# Patient Record
Sex: MALE | Race: WHITE | NOT HISPANIC OR LATINO | Employment: FULL TIME | URBAN - METROPOLITAN AREA
[De-identification: names, ages, dates, MRNs, and addresses within clinical notes are randomized per-mention and may not be internally consistent; named-entity substitution may affect disease eponyms.]

---

## 2019-05-21 ENCOUNTER — OFFICE VISIT (OUTPATIENT)
Dept: FAMILY MEDICINE CLINIC | Facility: CLINIC | Age: 54
End: 2019-05-21
Payer: COMMERCIAL

## 2019-05-21 VITALS
TEMPERATURE: 98.3 F | HEART RATE: 75 BPM | OXYGEN SATURATION: 96 % | DIASTOLIC BLOOD PRESSURE: 80 MMHG | SYSTOLIC BLOOD PRESSURE: 132 MMHG | BODY MASS INDEX: 33.7 KG/M2 | WEIGHT: 190.2 LBS | HEIGHT: 63 IN

## 2019-05-21 DIAGNOSIS — Z23 NEED FOR VACCINATION: Primary | ICD-10-CM

## 2019-05-21 DIAGNOSIS — Z23 NEED FOR SHINGLES VACCINE: ICD-10-CM

## 2019-05-21 DIAGNOSIS — L98.9 SKIN LESION OF BACK: ICD-10-CM

## 2019-05-21 DIAGNOSIS — G89.29 CHRONIC PAIN OF LEFT KNEE: ICD-10-CM

## 2019-05-21 DIAGNOSIS — M25.562 CHRONIC PAIN OF LEFT KNEE: ICD-10-CM

## 2019-05-21 DIAGNOSIS — M54.50 CHRONIC BILATERAL LOW BACK PAIN WITHOUT SCIATICA: ICD-10-CM

## 2019-05-21 DIAGNOSIS — Z23 NEED FOR TDAP VACCINATION: ICD-10-CM

## 2019-05-21 DIAGNOSIS — G89.29 CHRONIC BILATERAL LOW BACK PAIN WITHOUT SCIATICA: ICD-10-CM

## 2019-05-21 DIAGNOSIS — Z13.6 SCREENING FOR CARDIOVASCULAR CONDITION: ICD-10-CM

## 2019-05-21 PROCEDURE — 99204 OFFICE O/P NEW MOD 45 MIN: CPT | Performed by: FAMILY MEDICINE

## 2019-05-21 PROCEDURE — 3008F BODY MASS INDEX DOCD: CPT | Performed by: FAMILY MEDICINE

## 2019-05-21 PROCEDURE — 90471 IMMUNIZATION ADMIN: CPT | Performed by: FAMILY MEDICINE

## 2019-05-21 PROCEDURE — 90750 HZV VACC RECOMBINANT IM: CPT | Performed by: FAMILY MEDICINE

## 2019-05-30 ENCOUNTER — APPOINTMENT (OUTPATIENT)
Dept: RADIOLOGY | Facility: CLINIC | Age: 54
End: 2019-05-30
Payer: COMMERCIAL

## 2019-05-30 VITALS
DIASTOLIC BLOOD PRESSURE: 75 MMHG | HEIGHT: 63 IN | BODY MASS INDEX: 32.78 KG/M2 | WEIGHT: 185 LBS | SYSTOLIC BLOOD PRESSURE: 106 MMHG | HEART RATE: 98 BPM

## 2019-05-30 DIAGNOSIS — G89.29 CHRONIC BILATERAL LOW BACK PAIN WITHOUT SCIATICA: ICD-10-CM

## 2019-05-30 DIAGNOSIS — M22.42 CHONDROMALACIA, PATELLA, LEFT: ICD-10-CM

## 2019-05-30 DIAGNOSIS — M25.562 LEFT KNEE PAIN, UNSPECIFIED CHRONICITY: Primary | ICD-10-CM

## 2019-05-30 DIAGNOSIS — M25.562 CHRONIC PAIN OF LEFT KNEE: ICD-10-CM

## 2019-05-30 DIAGNOSIS — M54.50 CHRONIC BILATERAL LOW BACK PAIN WITHOUT SCIATICA: ICD-10-CM

## 2019-05-30 DIAGNOSIS — M25.562 LEFT KNEE PAIN, UNSPECIFIED CHRONICITY: ICD-10-CM

## 2019-05-30 DIAGNOSIS — G89.29 CHRONIC PAIN OF LEFT KNEE: ICD-10-CM

## 2019-05-30 PROCEDURE — 73562 X-RAY EXAM OF KNEE 3: CPT

## 2019-05-30 PROCEDURE — 99243 OFF/OP CNSLTJ NEW/EST LOW 30: CPT | Performed by: ORTHOPAEDIC SURGERY

## 2019-05-30 PROCEDURE — 20610 DRAIN/INJ JOINT/BURSA W/O US: CPT | Performed by: ORTHOPAEDIC SURGERY

## 2019-05-30 PROCEDURE — 73564 X-RAY EXAM KNEE 4 OR MORE: CPT

## 2019-05-30 RX ORDER — LIDOCAINE HYDROCHLORIDE 10 MG/ML
3 INJECTION, SOLUTION EPIDURAL; INFILTRATION; INTRACAUDAL; PERINEURAL
Status: COMPLETED | OUTPATIENT
Start: 2019-05-30 | End: 2019-05-30

## 2019-05-30 RX ORDER — METHYLPREDNISOLONE ACETATE 40 MG/ML
1 INJECTION, SUSPENSION INTRA-ARTICULAR; INTRALESIONAL; INTRAMUSCULAR; SOFT TISSUE
Status: COMPLETED | OUTPATIENT
Start: 2019-05-30 | End: 2019-05-30

## 2019-05-30 RX ADMIN — METHYLPREDNISOLONE ACETATE 1 ML: 40 INJECTION, SUSPENSION INTRA-ARTICULAR; INTRALESIONAL; INTRAMUSCULAR; SOFT TISSUE at 15:37

## 2019-05-30 RX ADMIN — LIDOCAINE HYDROCHLORIDE 3 ML: 10 INJECTION, SOLUTION EPIDURAL; INFILTRATION; INTRACAUDAL; PERINEURAL at 15:37

## 2019-06-05 ENCOUNTER — EVALUATION (OUTPATIENT)
Dept: PHYSICAL THERAPY | Facility: CLINIC | Age: 54
End: 2019-06-05
Payer: COMMERCIAL

## 2019-06-05 ENCOUNTER — CLINICAL SUPPORT (OUTPATIENT)
Dept: FAMILY MEDICINE CLINIC | Facility: CLINIC | Age: 54
End: 2019-06-05

## 2019-06-05 DIAGNOSIS — Z23 NEED FOR TDAP VACCINATION: Primary | ICD-10-CM

## 2019-06-05 DIAGNOSIS — M22.42 CHONDROMALACIA, PATELLA, LEFT: ICD-10-CM

## 2019-06-05 PROCEDURE — 97110 THERAPEUTIC EXERCISES: CPT | Performed by: PHYSICAL THERAPIST

## 2019-06-05 PROCEDURE — 97161 PT EVAL LOW COMPLEX 20 MIN: CPT | Performed by: PHYSICAL THERAPIST

## 2019-06-17 ENCOUNTER — APPOINTMENT (OUTPATIENT)
Dept: PHYSICAL THERAPY | Facility: CLINIC | Age: 54
End: 2019-06-17
Payer: COMMERCIAL

## 2019-06-17 NOTE — PROGRESS NOTES
Daily Note     Today's date: 2019  Patient name: Divina Stubbs  : 1965  MRN: [id number]  Referring provider: Gautam Blevins DO  Dx: No diagnosis found  Subjective: ***      Objective: See treatment diary below    Precautions: None      Daily Treatment Diary       Manuals                                                                 Exercise Diary              bike nv            HR step nv            prostretch nv            squats nv            Rev lunges nv            Side planks nv            Prone planks w/ hip ext nv            pallof press nv                                                                                                                                                                                                  Modalities                                         Assessment: Tolerated treatment {Tolerated treatment :8201297129}  Patient {assessment:0788210755}      Plan: {PLAN:8118819604}    {There is no content from the last Daily Treatment Diary section  }

## 2019-06-24 ENCOUNTER — OFFICE VISIT (OUTPATIENT)
Dept: PHYSICAL THERAPY | Facility: CLINIC | Age: 54
End: 2019-06-24
Payer: COMMERCIAL

## 2019-06-24 DIAGNOSIS — M22.42 CHONDROMALACIA, PATELLA, LEFT: Primary | ICD-10-CM

## 2019-06-24 PROCEDURE — 97110 THERAPEUTIC EXERCISES: CPT | Performed by: PHYSICAL THERAPIST

## 2019-07-23 ENCOUNTER — CLINICAL SUPPORT (OUTPATIENT)
Dept: FAMILY MEDICINE CLINIC | Facility: CLINIC | Age: 54
End: 2019-07-23
Payer: COMMERCIAL

## 2019-07-23 DIAGNOSIS — Z23 NEED FOR SHINGLES VACCINE: Primary | ICD-10-CM

## 2019-07-23 PROCEDURE — 90750 HZV VACC RECOMBINANT IM: CPT | Performed by: FAMILY MEDICINE

## 2019-07-23 PROCEDURE — 90471 IMMUNIZATION ADMIN: CPT | Performed by: FAMILY MEDICINE

## 2019-07-26 ENCOUNTER — OFFICE VISIT (OUTPATIENT)
Dept: OBGYN CLINIC | Facility: CLINIC | Age: 54
End: 2019-07-26
Payer: COMMERCIAL

## 2019-07-26 VITALS
BODY MASS INDEX: 33.49 KG/M2 | HEIGHT: 63 IN | SYSTOLIC BLOOD PRESSURE: 118 MMHG | DIASTOLIC BLOOD PRESSURE: 75 MMHG | HEART RATE: 76 BPM | WEIGHT: 189 LBS

## 2019-07-26 DIAGNOSIS — M22.42 CHONDROMALACIA OF LEFT PATELLOFEMORAL JOINT: Primary | ICD-10-CM

## 2019-07-26 PROCEDURE — 99213 OFFICE O/P EST LOW 20 MIN: CPT | Performed by: ORTHOPAEDIC SURGERY

## 2019-07-26 NOTE — PROGRESS NOTES
Ortho Sports Medicine Knee Follow Up Visit     Assesment:     47year old Male Left knee patellofemoral chondromalacia, doing great with no issues    Plan:    Conservative treatment:    Ice to knee for 20 minutes at least 1-2 times daily  Continue the exercises shown at PT at home for ROM/strengthening to knee, hip and core  4-point hip exercises sheet given   Continue weight loss     Imaging: All imaging from today was reviewed by myself and explained to the patient  Injection:    No Injection planned at this time  Surgery:     No surgery is recommended at this point, continue with conservative treatment plan as noted  Follow up:    Return if symptoms worsen or fail to improve  Chief Complaint   Patient presents with    Left Knee - Pain       History of Present Illness: The patient is returns for follow up of left knee pain  Since the prior visit, He reports significant improvement  He is no longer experiencing pain and clicking in his knee  He had to think which knee it was that was bothering him  The injection he received at his last appointment is still giving him relief  He had 3 sessions at PT and learned some exercises that he has continued to do at home  He also has learned to decrease the amount of time he crosses his legs because that was a source of pain he was unaware of prior  Pain was located anterior  Pain is improved by rest, physical therapy and injection  Pain is aggravated by stairs, squatting and weight bearing  Symptoms include clicking  The patient has tried rest, ice, NSAIDS, physical therapy and injection  I have reviewed the past medical, surgical, social and family history, medications and allergies as documented in the EMR  Review of systems: ROS is negative other than that noted in the HPI  Constitutional: Negative for fatigue and fever        Physical Exam:    Blood pressure 118/75, pulse 76, height 5' 3" (1 6 m), weight 85 7 kg (189 lb)  General/Constitutional: NAD, well developed, well nourished  HENT: Normocephalic, atraumatic  CV: Intact distal pulses, regular rate  Resp: No respiratory distress or labored breathing  Lymphatic: No lymphadenopathy palpated  Neuro: Alert and Oriented x 3, no focal deficits  Psych: Normal mood, normal affect, normal judgement, normal behavior  Skin: Warm, dry, no rashes, no erythema      Knee Exam (focused): RIGHT LEFT   ROM:   0-130 0-130   Palpation: Effusion negative negative     MJL tenderness Negative Negative     LJL tenderness Negative Negative   Meniscus:  Fartun Negative Negative    Apley's Compression Negative Negative   Instability: Varus stable stable     Valgus stable stable   Special Tests: Lachman Negative Negative     Posterior drawer Negative Negative     Anterior drawer Negative Negative     Pivot shift not tested not tested     Dial not tested not tested   Patella: Palpation no tenderness no tenderness     Mobility 1/4 1/4     Apprehension Negative Negative   Other: Single leg 1/4 squat not tested not tested                 LE NV Exam: +2 DP/PT pulses bilaterally  Sensation intact to light touch L2-S1 bilaterally    No calf tenderness to palpation bilaterally      Knee Imaging    No imaging was performed today

## 2019-07-29 ENCOUNTER — OFFICE VISIT (OUTPATIENT)
Dept: FAMILY MEDICINE CLINIC | Facility: CLINIC | Age: 54
End: 2019-07-29
Payer: COMMERCIAL

## 2019-07-29 VITALS
HEART RATE: 86 BPM | BODY MASS INDEX: 34.55 KG/M2 | SYSTOLIC BLOOD PRESSURE: 120 MMHG | WEIGHT: 195 LBS | OXYGEN SATURATION: 97 % | TEMPERATURE: 97.6 F | DIASTOLIC BLOOD PRESSURE: 78 MMHG | HEIGHT: 63 IN

## 2019-07-29 DIAGNOSIS — L25.5 CONTACT DERMATITIS DUE TO PLANTS, EXCEPT FOOD, UNSPECIFIED CONTACT DERMATITIS TYPE: Primary | ICD-10-CM

## 2019-07-29 DIAGNOSIS — L98.9 SKIN LESION OF BACK: ICD-10-CM

## 2019-07-29 PROCEDURE — 1036F TOBACCO NON-USER: CPT | Performed by: FAMILY MEDICINE

## 2019-07-29 PROCEDURE — 99213 OFFICE O/P EST LOW 20 MIN: CPT | Performed by: FAMILY MEDICINE

## 2019-07-29 PROCEDURE — 3008F BODY MASS INDEX DOCD: CPT | Performed by: FAMILY MEDICINE

## 2019-07-29 RX ORDER — HYDROXYZINE HYDROCHLORIDE 25 MG/1
25 TABLET, FILM COATED ORAL EVERY 6 HOURS PRN
Qty: 30 TABLET | Refills: 0 | Status: SHIPPED | OUTPATIENT
Start: 2019-07-29 | End: 2022-06-07

## 2019-07-29 RX ORDER — PREDNISONE 10 MG/1
TABLET ORAL
Qty: 45 TABLET | Refills: 0 | Status: SHIPPED | OUTPATIENT
Start: 2019-07-29 | End: 2021-11-04 | Stop reason: ALTCHOICE

## 2019-07-29 NOTE — PROGRESS NOTES
Ernesto Bonilla 1965 male MRN: [id number]    Family Medicine Acute Visit    ASSESSMENT/PLAN  Problem List Items Addressed This Visit        Musculoskeletal and Integument    Skin lesion of back    Relevant Orders    Ambulatory referral to Dermatology    Contact dermatitis due to plants, except food - Primary    Relevant Medications    predniSONE 10 mg tablet    hydrOXYzine HCL (ATARAX) 25 mg tablet                Future Appointments   Date Time Provider Shonda Yoo   7/29/2019  2:30 PM DO [facility]          SUBJECTIVE  CC: Poison Ivy (Poison ivy arms, legs, neck)      HPI:  Ernesto Bonilla is a 47 y o  male who presents for poison ivy  Review of Systems   Constitutional: Negative for chills and fever  HENT: Negative for congestion, postnasal drip, rhinorrhea and sinus pain  Eyes: Negative for photophobia and visual disturbance  Respiratory: Negative for cough and shortness of breath  Cardiovascular: Negative for chest pain, palpitations and leg swelling  Gastrointestinal: Negative for abdominal pain, constipation, diarrhea, nausea and vomiting  Genitourinary: Negative for difficulty urinating and dysuria  Musculoskeletal: Negative for arthralgias and myalgias  Skin: Positive for rash  Neurological: Negative for dizziness and syncope  Historical Information   The patient history was reviewed as follows:  History reviewed  No pertinent past medical history        Past Surgical History:   Procedure Laterality Date    TONSILLECTOMY       Family History   Problem Relation Age of Onset    Heart defect Mother     Stroke Father     Coronary artery disease Father     Prostate cancer Father     Diabetes Maternal Grandmother     Diabetes Maternal Aunt     Diabetes Paternal Uncle       Social History   Social History     Substance and Sexual Activity   Alcohol Use Yes    Alcohol/week: 2 0 standard drinks    Types: 2 Glasses of wine per week  Frequency: 2-3 times a week    Drinks per session: 1 or 2    Binge frequency: Weekly     Social History     Substance and Sexual Activity   Drug Use Never     Social History     Tobacco Use   Smoking Status Never Smoker   Smokeless Tobacco Never Used       Medications:     Current Outpatient Medications:     hydrOXYzine HCL (ATARAX) 25 mg tablet, Take 1 tablet (25 mg total) by mouth every 6 (six) hours as needed for itching, Disp: 30 tablet, Rfl: 0    predniSONE 10 mg tablet, Take 5 tablets/day for 3 days, 4 tablets/day for 3 days, 3 tablets/day for 3 days, 2 tablets/day for 3 days, 1 tablet/day for 3 days, Disp: 45 tablet, Rfl: 0    Allergies   Allergen Reactions    Penicillins      Patient was told by parents        OBJECTIVE  Vitals:   Vitals:    07/29/19 1409   BP: 120/78   BP Location: Right arm   Patient Position: Sitting   Cuff Size: Large   Pulse: 86   Temp: 97 6 °F (36 4 °C)   TempSrc: Tympanic   SpO2: 97%   Weight: 88 5 kg (195 lb)   Height: 5' 3" (1 6 m)         Physical Exam   Constitutional: He is oriented to person, place, and time  He appears well-developed and well-nourished  HENT:   Head: Normocephalic and atraumatic  Right Ear: External ear normal    Left Ear: External ear normal    Mouth/Throat: Oropharynx is clear and moist    Eyes: Pupils are equal, round, and reactive to light  Conjunctivae and EOM are normal    Neck: Normal range of motion  Neck supple  Cardiovascular: Normal rate, regular rhythm and normal heart sounds  No murmur heard  Pulmonary/Chest: Effort normal and breath sounds normal  No respiratory distress  He has no wheezes  Abdominal: Soft  Bowel sounds are normal  He exhibits no distension  Musculoskeletal: Normal range of motion  He exhibits no edema  Neurological: He is alert and oriented to person, place, and time  Skin: Skin is warm and dry  Rash noted  Rash is urticarial    Psychiatric: He has a normal mood and affect   His behavior is normal  Johann Thrasher, DO    7/29/2019

## 2021-04-08 DIAGNOSIS — Z23 ENCOUNTER FOR IMMUNIZATION: ICD-10-CM

## 2021-06-26 ENCOUNTER — OFFICE VISIT (OUTPATIENT)
Dept: URGENT CARE | Facility: CLINIC | Age: 56
End: 2021-06-26
Payer: COMMERCIAL

## 2021-06-26 VITALS
BODY MASS INDEX: 33.63 KG/M2 | RESPIRATION RATE: 16 BRPM | DIASTOLIC BLOOD PRESSURE: 88 MMHG | OXYGEN SATURATION: 95 % | HEART RATE: 83 BPM | TEMPERATURE: 99.2 F | HEIGHT: 63 IN | SYSTOLIC BLOOD PRESSURE: 134 MMHG | WEIGHT: 189.8 LBS

## 2021-06-26 DIAGNOSIS — L25.5 DERMATITIS DUE TO PLANTS, INCLUDING POISON IVY, SUMAC, AND OAK: Primary | ICD-10-CM

## 2021-06-26 PROCEDURE — 99213 OFFICE O/P EST LOW 20 MIN: CPT | Performed by: PHYSICIAN ASSISTANT

## 2021-06-26 RX ORDER — DIPHENOXYLATE HYDROCHLORIDE AND ATROPINE SULFATE 2.5; .025 MG/1; MG/1
1 TABLET ORAL DAILY
COMMUNITY

## 2021-06-26 RX ORDER — PREDNISONE 20 MG/1
TABLET ORAL
Qty: 18 TABLET | Refills: 0 | Status: SHIPPED | OUTPATIENT
Start: 2021-06-26 | End: 2021-11-04 | Stop reason: ALTCHOICE

## 2021-06-26 NOTE — PATIENT INSTRUCTIONS
Keep area clean and dry  Can use cool compresses to decrease itching  Heat can make areas itch more  Can use over-the-counter poison ivy creams and lotions as needed  Take prednisone in the morning with food  Wash hands often and try to avoid scratching at areas  Can use over-the-counter creams and lotions like [place], Zanfel or Aveeno to help with symptoms  Do not take NSAIDs (ibuprofen, Advil, Motrin, naproxen, Aleve, aspirin) while taking prednisone  Poison Ivy   WHAT YOU NEED TO KNOW:   Poison ivy is a plant that can cause an itchy, uncomfortable rash on your skin  Poison ivy grows as a shrub or vine in woods, fields, and areas of thick [place]  It has 3 bright green leaves on each stem that turn red in duc  DISCHARGE INSTRUCTIONS:   Medicines:   · Antiseptic or drying creams or ointments: These medicines may be used to dry out the rash and decrease the itching  These products may be available without a doctor's order  · Steroids: This medicine helps decrease itching and inflammation  It can be given as a cream to apply to your skin or as a pill  · Antihistamines: This medicine may help decrease itching and help you sleep  It is available without a doctor's order  · Take your medicine as directed  Contact your healthcare provider if you think your medicine is not helping or if you have side effects  Tell him of her if you are allergic to any medicine  Keep a list of the medicines, vitamins, and herbs you take  Include the amounts, and when and why you take them  Bring the list or the pill bottles to follow-up visits  Carry your medicine list with you in case of an emergency  Follow up with your healthcare provider as directed:  Write down your questions so you remember to ask them during your visits  How your poison ivy rash spreads: You cannot spread poison ivy by touching your rash or the liquid from your blisters   Poison ivy is spread only if you scratch your skin while it still has oil on it  You may think your rash is spreading because new rashes appear over a number of days  This happens because areas covered by thin skin break out in a rash first  Your face or forearms may develop a rash before thicker areas, such as the palms of your hands  Self-care:   · Keep your rash clean and dry:  Wash it with soap and water  Gently pat it dry with a clean towel  · Try not to scratch or rub your rash: This can cause your skin to become infected  · Use a compress on your rash:  Dip a clean washcloth in cool water  Wring it out and place it on your rash  Leave the washcloth on your skin for 15 minutes  Do this at least 3 times per day  · Take a cornstarch or oatmeal bath: If your rash is too large to cover with wet washcloths, take 3 or 4 cornstarch baths daily  Mix 1 pound of cornstarch with a little water to make a paste  Add the paste to a tub full of water and mix well  You may also use colloidal oatmeal in the bath water  Use lukewarm water  Avoid hot water because it may cause your itching to increase  Prevent a poison ivy rash in the future:   · Wear skin protection:  Wear long pants, a long-sleeved shirt, and gloves  Use a skin block lotion to protect your skin from poison ivy oil  You can find this at a drugstore without a prescription  · Wash clothing after possible exposure: If you think you have been near a poison ivy plant, wash the clothes you were wearing separately from other clothes  Rinse the washing machine well after you take the clothes out  Scrub boots and shoes with warm, soapy water  Dry clean items and clothing that you cannot wash in water  Poison ivy oil is sticky and can stay on surfaces for a long time  It can cause a new rash even years later  · Bathe your pet:  Use warm water and shampoo on your pet's fur  This will prevent the spread of oil to your skin, car, and home   Wear long sleeves, long pants, and gloves while washing pets or any items that may have oil on them  · Reduce exposure to poison ivy:  Do not touch plants that look like poison ivy  Keep your yard free of poison ivy  While protecting your skin, remove the plant and the roots  Place them in a plastic bag and seal the bag tightly  · Do not burn poison ivy plants: This can spread the oil through the air  If you breathe the oil into your lungs, you could have swelling and serious breathing problems  Oil that clings to the fire gianfranco can land on your skin and cause a rash  Contact your healthcare provider if:   · You have pus, soft yellow scabs, or tenderness on the rash  · The itching gets worse or keeps you awake at night  · The rash covers more than 1/4 of your skin or spreads to your eyes, mouth, or genital area  · The rash is not better after 2 to 3 weeks  · You have tender, swollen glands on the sides of your neck  · You have swelling in your arms and legs  · You have questions or concerns about your condition or care  Return to the emergency department if:   · You have a fever  · You have redness, swelling, and tenderness around the rash  · You have trouble breathing  © Copyright [address] Information is for End User's use only and may not be sold, redistributed or otherwise used for commercial purposes  All illustrations and images included in CareNotes® are the copyrighted property of A D A M [employer] Inc  or Abelardo Apodaca  The above information is an  only  It is not intended as medical advice for individual conditions or treatments  Talk to your doctor, nurse or pharmacist before following any medical regimen to see if it is safe and effective for you

## 2021-08-30 ENCOUNTER — OFFICE VISIT (OUTPATIENT)
Dept: FAMILY MEDICINE CLINIC | Facility: CLINIC | Age: 56
End: 2021-08-30
Payer: COMMERCIAL

## 2021-08-30 ENCOUNTER — APPOINTMENT (OUTPATIENT)
Dept: RADIOLOGY | Facility: CLINIC | Age: 56
End: 2021-08-30
Payer: COMMERCIAL

## 2021-08-30 VITALS
RESPIRATION RATE: 18 BRPM | DIASTOLIC BLOOD PRESSURE: 80 MMHG | TEMPERATURE: 98.1 F | BODY MASS INDEX: 34.69 KG/M2 | OXYGEN SATURATION: 96 % | HEIGHT: 63 IN | WEIGHT: 195.8 LBS | SYSTOLIC BLOOD PRESSURE: 128 MMHG | HEART RATE: 76 BPM

## 2021-08-30 DIAGNOSIS — R05.9 COUGH: Primary | ICD-10-CM

## 2021-08-30 DIAGNOSIS — J40 BRONCHITIS: ICD-10-CM

## 2021-08-30 DIAGNOSIS — B34.9 VIRAL INFECTION, UNSPECIFIED: ICD-10-CM

## 2021-08-30 DIAGNOSIS — R05.9 COUGH: ICD-10-CM

## 2021-08-30 PROCEDURE — U0003 INFECTIOUS AGENT DETECTION BY NUCLEIC ACID (DNA OR RNA); SEVERE ACUTE RESPIRATORY SYNDROME CORONAVIRUS 2 (SARS-COV-2) (CORONAVIRUS DISEASE [COVID-19]), AMPLIFIED PROBE TECHNIQUE, MAKING USE OF HIGH THROUGHPUT TECHNOLOGIES AS DESCRIBED BY CMS-2020-01-R: HCPCS | Performed by: FAMILY MEDICINE

## 2021-08-30 PROCEDURE — 99214 OFFICE O/P EST MOD 30 MIN: CPT | Performed by: FAMILY MEDICINE

## 2021-08-30 PROCEDURE — 1036F TOBACCO NON-USER: CPT | Performed by: FAMILY MEDICINE

## 2021-08-30 PROCEDURE — 71046 X-RAY EXAM CHEST 2 VIEWS: CPT

## 2021-08-30 PROCEDURE — 3725F SCREEN DEPRESSION PERFORMED: CPT | Performed by: FAMILY MEDICINE

## 2021-08-30 PROCEDURE — U0005 INFEC AGEN DETEC AMPLI PROBE: HCPCS | Performed by: FAMILY MEDICINE

## 2021-08-30 PROCEDURE — 3008F BODY MASS INDEX DOCD: CPT | Performed by: FAMILY MEDICINE

## 2021-08-30 RX ORDER — PREDNISONE 10 MG/1
TABLET ORAL
Qty: 21 TABLET | Refills: 0 | Status: SHIPPED | OUTPATIENT
Start: 2021-08-30 | End: 2021-11-04 | Stop reason: ALTCHOICE

## 2021-08-30 RX ORDER — DOXYCYCLINE HYCLATE 100 MG/1
100 CAPSULE ORAL EVERY 12 HOURS SCHEDULED
Qty: 14 CAPSULE | Refills: 0 | Status: SHIPPED | OUTPATIENT
Start: 2021-08-30 | End: 2021-09-06

## 2021-08-30 NOTE — PROGRESS NOTES
Walt Snow 1965 male MRN: [id number]    Family Medicine Acute Visit    ASSESSMENT/PLAN  Problem List Items Addressed This Visit        Other    Cough - Primary    Relevant Orders    XR chest pa & lateral    BMI 34 0-34 9,adult     BMI Counseling: Body mass index is 34 68 kg/m²  The BMI is above normal  Nutrition recommendations include reducing portion sizes, decreasing overall calorie intake and 3-5 servings of fruits/vegetables daily  Exercise recommendations include exercising 3-5 times per week  Other Visit Diagnoses     Viral infection, unspecified        Relevant Orders    Novel Coronavirus (Covid-19),PCR [facility] - Collected in Office    XR chest pa & lateral    Bronchitis        Relevant Medications    doxycycline hyclate (VIBRAMYCIN) 100 mg capsule    predniSONE 10 mg tablet            Follow up for CP    Future Appointments   Date Time Provider Shonda Yoo   10/14/2021  7:30 AM DO [facility]          SUBJECTIVE  CC: Chest Congestion (was cleaning out basement and touched moldy water ), Chest Tightness, and Cough      HPI:  Walt Snow is a 48 y o  male who presents for sick visit  1 week of cough, congestion, chest tightness  Reports last week he was cleaning out his basement and had exposure to possible mold  He is not having fever  No exposure to covid  Review of Systems   Constitutional: Negative for chills and fever  HENT: Positive for congestion  Negative for postnasal drip, rhinorrhea and sinus pain  Eyes: Negative for photophobia and visual disturbance  Respiratory: Positive for cough and wheezing  Negative for shortness of breath  Cardiovascular: Negative for chest pain, palpitations and leg swelling  Gastrointestinal: Negative for abdominal pain, constipation, diarrhea, nausea and vomiting  Genitourinary: Negative for difficulty urinating and dysuria  Musculoskeletal: Negative for arthralgias and myalgias     Skin: Negative for rash  Neurological: Negative for dizziness and syncope  Historical Information   The patient history was reviewed as follows:  Past Medical History:   Diagnosis Date    Chondromalacia of left patellofemoral joint          Past Surgical History:   Procedure Laterality Date    TONSILLECTOMY       Family History   Problem Relation Age of Onset    Heart defect Mother     Stroke Father     Coronary artery disease Father     Prostate cancer Father     Diabetes Maternal Grandmother     Diabetes Maternal Aunt     Diabetes Paternal Uncle       Social History   Social History     Substance and Sexual Activity   Alcohol Use Yes    Alcohol/week: 2 0 standard drinks    Types: 2 Glasses of wine per week     Social History     Substance and Sexual Activity   Drug Use Never     Social History     Tobacco Use   Smoking Status Never Smoker   Smokeless Tobacco Never Used       Medications:     Current Outpatient Medications:     multivitamin (THERAGRAN) TABS, Take 1 tablet by mouth daily, Disp: , Rfl:     doxycycline hyclate (VIBRAMYCIN) 100 mg capsule, Take 1 capsule (100 mg total) by mouth every 12 (twelve) hours for 7 days, Disp: 14 capsule, Rfl: 0    hydrOXYzine HCL (ATARAX) 25 mg tablet, Take 1 tablet (25 mg total) by mouth every 6 (six) hours as needed for itching (Patient not taking: Reported on 6/26/2021), Disp: 30 tablet, Rfl: 0    predniSONE 10 mg tablet, Take 5 tablets/day for 3 days, 4 tablets/day for 3 days, 3 tablets/day for 3 days, 2 tablets/day for 3 days, 1 tablet/day for 3 days (Patient not taking: Reported on 6/26/2021), Disp: 45 tablet, Rfl: 0    predniSONE 10 mg tablet, Take 60 mg x 1 day, then 50 mg x1 day, then 40 mg x1 day, then 30 mg x1 day, then 20 mg x 1 day, then  10 mg x1 day, Disp: 21 tablet, Rfl: 0    predniSONE 20 mg tablet, Take 3 tablets for 3 days, then 2 tablets for 3 days, then 1 tablet for 3 days  Take in morning with food   (Patient not taking: Reported on 8/30/2021), Disp: 18 tablet, Rfl: 0    Allergies   Allergen Reactions    Penicillins      Patient was told by parents        OBJECTIVE  Vitals:   Vitals:    08/30/21 0949   BP: 128/80   BP Location: Left arm   Patient Position: Sitting   Cuff Size: Large   Pulse: 76   Resp: 18   Temp: 98 1 °F (36 7 °C)   TempSrc: Tympanic   SpO2: 96%   Weight: 88 8 kg (195 lb 12 8 oz)   Height: 5' 3" (1 6 m)         Physical Exam  Constitutional:       Appearance: He is well-developed  HENT:      Head: Normocephalic and atraumatic  Right Ear: External ear normal       Left Ear: External ear normal       Mouth/Throat:      Pharynx: Posterior oropharyngeal erythema present  Eyes:      Conjunctiva/sclera: Conjunctivae normal       Pupils: Pupils are equal, round, and reactive to light  Cardiovascular:      Rate and Rhythm: Normal rate and regular rhythm  Heart sounds: Normal heart sounds  No murmur heard  Pulmonary:      Effort: Pulmonary effort is normal  No respiratory distress  Breath sounds: Normal breath sounds  No wheezing  Comments: Course breath sounds b/l  Musculoskeletal:         General: Normal range of motion  Cervical back: Normal range of motion and neck supple  Skin:     General: Skin is warm and dry  Neurological:      Mental Status: He is alert and oriented to person, place, and time     Psychiatric:         Behavior: Behavior normal                     Mariel Rhoades DO    8/30/2021

## 2021-08-30 NOTE — ASSESSMENT & PLAN NOTE
BMI Counseling: Body mass index is 34 68 kg/m²  The BMI is above normal  Nutrition recommendations include reducing portion sizes, decreasing overall calorie intake and 3-5 servings of fruits/vegetables daily  Exercise recommendations include exercising 3-5 times per week

## 2021-08-31 LAB — SARS-COV-2 RNA RESP QL NAA+PROBE: NEGATIVE

## 2021-11-04 ENCOUNTER — OFFICE VISIT (OUTPATIENT)
Dept: FAMILY MEDICINE CLINIC | Facility: CLINIC | Age: 56
End: 2021-11-04
Payer: COMMERCIAL

## 2021-11-04 VITALS
BODY MASS INDEX: 33.38 KG/M2 | RESPIRATION RATE: 18 BRPM | HEIGHT: 63 IN | DIASTOLIC BLOOD PRESSURE: 78 MMHG | WEIGHT: 188.4 LBS | HEART RATE: 76 BPM | OXYGEN SATURATION: 96 % | TEMPERATURE: 98.1 F | SYSTOLIC BLOOD PRESSURE: 130 MMHG

## 2021-11-04 DIAGNOSIS — Z12.83 SKIN EXAM, SCREENING FOR CANCER: ICD-10-CM

## 2021-11-04 DIAGNOSIS — Z12.5 SCREENING FOR PROSTATE CANCER: ICD-10-CM

## 2021-11-04 DIAGNOSIS — Z00.00 ANNUAL PHYSICAL EXAM: Primary | ICD-10-CM

## 2021-11-04 DIAGNOSIS — Z82.49 FAMILY HISTORY OF ABDOMINAL AORTIC ANEURYSM: ICD-10-CM

## 2021-11-04 DIAGNOSIS — Z12.11 SCREENING FOR COLORECTAL CANCER: ICD-10-CM

## 2021-11-04 DIAGNOSIS — Z13.6 SCREENING FOR CARDIOVASCULAR CONDITION: ICD-10-CM

## 2021-11-04 DIAGNOSIS — Z12.12 SCREENING FOR COLORECTAL CANCER: ICD-10-CM

## 2021-11-04 DIAGNOSIS — K63.5 POLYP OF COLON, UNSPECIFIED PART OF COLON, UNSPECIFIED TYPE: ICD-10-CM

## 2021-11-04 DIAGNOSIS — Z23 NEED FOR IMMUNIZATION AGAINST INFLUENZA: ICD-10-CM

## 2021-11-04 DIAGNOSIS — Z13.1 SCREENING FOR DIABETES MELLITUS: ICD-10-CM

## 2021-11-04 PROCEDURE — 3725F SCREEN DEPRESSION PERFORMED: CPT | Performed by: FAMILY MEDICINE

## 2021-11-04 PROCEDURE — 90682 RIV4 VACC RECOMBINANT DNA IM: CPT

## 2021-11-04 PROCEDURE — 99396 PREV VISIT EST AGE 40-64: CPT | Performed by: FAMILY MEDICINE

## 2021-11-04 PROCEDURE — 90471 IMMUNIZATION ADMIN: CPT

## 2021-11-04 PROCEDURE — 1036F TOBACCO NON-USER: CPT | Performed by: FAMILY MEDICINE

## 2021-11-04 PROCEDURE — 3008F BODY MASS INDEX DOCD: CPT | Performed by: FAMILY MEDICINE

## 2022-01-02 ENCOUNTER — OFFICE VISIT (OUTPATIENT)
Dept: URGENT CARE | Facility: CLINIC | Age: 57
End: 2022-01-02
Payer: COMMERCIAL

## 2022-01-02 VITALS
HEART RATE: 99 BPM | RESPIRATION RATE: 20 BRPM | TEMPERATURE: 99.6 F | HEIGHT: 63 IN | OXYGEN SATURATION: 98 % | BODY MASS INDEX: 31.89 KG/M2 | WEIGHT: 180 LBS

## 2022-01-02 DIAGNOSIS — Z11.59 SPECIAL SCREENING EXAMINATION FOR VIRAL DISEASE: Primary | ICD-10-CM

## 2022-01-02 PROCEDURE — 99213 OFFICE O/P EST LOW 20 MIN: CPT

## 2022-01-02 PROCEDURE — 87636 SARSCOV2 & INF A&B AMP PRB: CPT | Performed by: PHYSICIAN ASSISTANT

## 2022-01-02 NOTE — PATIENT INSTRUCTIONS

## 2022-01-02 NOTE — PROGRESS NOTES
NAME: Renan Hendrix is a 55 y o  male  : 1965    MRN: [id number]      Assessment and Plan   Special screening examination for viral disease [Z11 59]  1  Special screening examination for viral disease  COVID/FLU- Office Collect      patient swabbed for COVID instructed to quarantine until results come back  Over-the-counter medications, fluids and rest   If no improvement negative test follow-up with PCP  ER if anything changes or worsens  He acknowledges      Patient Instructions     Patient Instructions   COVID-19 Home Care Guidelines    Your healthcare provider and/or public health staff have evaluated you and have determined that you do not need to remain in the hospital at this time  At this time you can be isolated at home where you will be monitored by staff from your local or state health department  You should carefully follow the prevention and isolation steps below until a healthcare provider or local or state health department says that you can return to your normal activities  Stay home except to get medical care    People who are mildly ill with COVID-19 are able to isolate at home during their illness  You should restrict activities outside your home, except for getting medical care  Do not go to work, school, or public areas  Avoid using public transportation, ride-sharing, or taxis  Separate yourself from other people and animals in your home    People: As much as possible, you should stay in a specific room and away from other people in your home  Also, you should use a separate bathroom, if available  Animals: You should restrict contact with pets and other animals while you are sick with COVID-19, just like you would around other people  Although there have not been reports of pets or other animals becoming sick with COVID-19, it is still recommended that people sick with COVID-19 limit contact with animals until more information is known about the virus   When possible, have another member of your household care for your animals while you are sick  If you are sick with COVID-19, avoid contact with your pet, including petting, snuggling, being kissed or licked, and sharing food  If you must care for your pet or be around animals while you are sick, wash your hands before and after you interact with pets and wear a facemask  See COVID-19 and Animals for more information  Call ahead before visiting your doctor    If you have a medical appointment, call the healthcare provider and tell them that you have or may have COVID-19  This will help the healthcare providers office take steps to keep other people from getting infected or exposed  Wear a facemask    You should wear a facemask when you are around other people (e g , sharing a room or vehicle) or pets and before you enter a healthcare providers office  If you are not able to wear a facemask (for example, because it causes trouble breathing), then people who live with you should not stay in the same room with you, or they should wear a facemask if they enter your room  Cover your coughs and sneezes    Cover your mouth and nose with a tissue when you cough or sneeze  Throw used tissues in a lined trash can  Immediately wash your hands with soap and water for at least 20 seconds or, if soap and water are not available, clean your hands with an alcohol-based hand  that contains at least 60% alcohol  Clean your hands often    Wash your hands often with soap and water for at least 20 seconds, especially after blowing your nose, coughing, or sneezing; going to the bathroom; and before eating or preparing food  If soap and water are not readily available, use an alcohol-based hand  with at least 60% alcohol, covering all surfaces of your hands and rubbing them together until they feel dry  Soap and water are the best option if hands are visibly dirty   Avoid touching your eyes, nose, and mouth with unwashed hands  Avoid sharing personal household items    You should not share dishes, drinking glasses, cups, eating utensils, towels, or bedding with other people or pets in your home  After using these items, they should be washed thoroughly with soap and water  Clean all high-touch surfaces everyday    High touch surfaces include counters, tabletops, doorknobs, bathroom fixtures, toilets, phones, keyboards, tablets, and bedside tables  Also, clean any surfaces that may have blood, stool, or body fluids on them  Use a household cleaning spray or wipe, according to the label instructions  Labels contain instructions for safe and effective use of the cleaning product including precautions you should take when applying the product, such as wearing gloves and making sure you have good ventilation during use of the product  Monitor your symptoms    Seek prompt medical attention if your illness is worsening (e g , difficulty breathing)  Before seeking care, call your healthcare provider and tell them that you have, or are being evaluated for, COVID-19  Put on a facemask before you enter the facility  These steps will help the healthcare providers office to keep other people in the office or waiting room from getting infected or exposed  Ask your healthcare provider to call the local or formerly [facility] health department  Persons who are placed under active monitoring or facilitated self-monitoring should follow instructions provided by their local health department or occupational health professionals, as appropriate  If you have a medical emergency and need to call 911, notify the dispatch personnel that you have, or are being evaluated for COVID-19  If possible, put on a facemask before emergency medical services arrive      Discontinuing home isolation    Patients with confirmed COVID-19 should remain under home isolation precautions until the following conditions are met:   - They have had no fever for at least 24 hours (that is one full day of no fever without the use medicine that reduces fevers)  AND  - other symptoms have improved (for example, when their cough or shortness of breath have improved)  AND  - If had mild or moderate illness, at least 10 days have passed since their symptoms first appeared or if severe illness (needed oxygen) or immunosuppressed, at least 20 days have passed since symptoms first appeared  Patients with confirmed COVID-19 should also notify close contacts (including their workplace) and ask that they self-quarantine  Currently, close contact is defined as being within 6 feet for 15 minutes or more from the period 24 hours starting 48 hours before symptom onset to the time at which the patient went into isolation  Close contacts of patients diagnosed with COVID-19 should be instructed by the patient to self-quarantine for 14 days from the last time of their last contact with the patient  Source: [employer]      Proceed to ER if symptoms worsen  Chief Complaint     Chief Complaint   Patient presents with    Headache     Symptoms began New Years Lashae  Wife currently symptomatic as well, undiagnosed  No hx of COVID; Fully vx as of 4/2021   Nasal Congestion    Cough    Fatigue    Fever         History of Present Illness   Patient with no pertinent past medical history presents complaining of cold-like symptoms x2 days  Reports Friday started with cough, congestion, rhinorrhea, fatigue and fever with T-max 100 2°  Also complains of body aches  Denies any chest pain, palpitations, shortness of breath or dyspnea  No nausea, vomiting or diarrhea  Has been taking over-the-counter medications without relief  Vaccinated for covid      Review of Systems   Review of Systems   Constitutional: Positive for fatigue and fever  Negative for chills     HENT: Positive for congestion, postnasal drip, rhinorrhea, sinus pressure and sore throat  Negative for trouble swallowing  Respiratory: Positive for cough and chest tightness  Negative for shortness of breath, wheezing and stridor  Cardiovascular: Negative for chest pain and palpitations  Gastrointestinal: Negative for abdominal pain, diarrhea, nausea and vomiting  Musculoskeletal: Positive for myalgias  Neurological: Negative for dizziness, weakness and numbness  Current Medications       Current Outpatient Medications:     hydrOXYzine HCL (ATARAX) 25 mg tablet, Take 1 tablet (25 mg total) by mouth every 6 (six) hours as needed for itching (Patient not taking: Reported on 6/26/2021), Disp: 30 tablet, Rfl: 0    multivitamin (THERAGRAN) TABS, Take 1 tablet by mouth daily, Disp: , Rfl:     Current Allergies     Allergies as of 01/02/2022 - Reviewed 01/02/2022   Allergen Reaction Noted    Penicillins  05/21/2019              Past Medical History:   Diagnosis Date    Chondromalacia of left patellofemoral joint        Past Surgical History:   Procedure Laterality Date    TONSILLECTOMY         Family History   Problem Relation Age of Onset    Heart defect Mother     Stroke Father     Coronary artery disease Father     Prostate cancer Father     Diabetes Maternal Grandmother     Diabetes Maternal Aunt     Diabetes Paternal Uncle          Medications have been verified  The following portions of the patient's history were reviewed and updated as appropriate: allergies, current medications, past family history, past medical history, past social history, past surgical history and problem list     Objective   Pulse 99   Temp 99 6 °F (37 6 °C)   Resp 20   Ht 5' 3" (1 6 m)   Wt 81 6 kg (180 lb)   SpO2 98%   BMI 31 89 kg/m²      Physical Exam     Physical Exam  Vitals and nursing note reviewed  Constitutional:       General: He is not in acute distress  Appearance: Normal appearance  He is not ill-appearing, toxic-appearing or diaphoretic     HENT: Mouth/Throat:      Mouth: Mucous membranes are moist       Pharynx: Oropharynx is clear  No oropharyngeal exudate or posterior oropharyngeal erythema  Cardiovascular:      Rate and Rhythm: Normal rate and regular rhythm  Heart sounds: Normal heart sounds  Pulmonary:      Effort: Pulmonary effort is normal  No respiratory distress  Breath sounds: Normal breath sounds  No stridor  No wheezing, rhonchi or rales  Skin:     Capillary Refill: Capillary refill takes less than 2 seconds  Neurological:      Mental Status: He is alert and oriented to person, place, and time

## 2022-01-06 ENCOUNTER — TELEPHONE (OUTPATIENT)
Dept: FAMILY MEDICINE CLINIC | Facility: CLINIC | Age: 57
End: 2022-01-06

## 2022-01-06 LAB
FLUAV RNA RESP QL NAA+PROBE: NEGATIVE
FLUBV RNA RESP QL NAA+PROBE: NEGATIVE
SARS-COV-2 RNA RESP QL NAA+PROBE: NEGATIVE

## 2022-01-25 ENCOUNTER — TELEPHONE (OUTPATIENT)
Dept: GASTROENTEROLOGY | Facility: CLINIC | Age: 57
End: 2022-01-25

## 2022-01-25 ENCOUNTER — PREP FOR PROCEDURE (OUTPATIENT)
Dept: GASTROENTEROLOGY | Facility: CLINIC | Age: 57
End: 2022-01-25

## 2022-01-25 DIAGNOSIS — Z86.010 HISTORY OF COLON POLYPS: Primary | ICD-10-CM

## 2022-01-25 NOTE — TELEPHONE ENCOUNTER
01/25/22  Screened by: Bianca Carter    Referring Provider  Recall     Pre- Screening: There is no height or weight on file to calculate BMI  Has patient been referred for a routine screening Colonoscopy? yes  Is the patient between 39-70 years old? yes      Previous Colonoscopy yes   If yes:    Date: 2018    Facility: [facility]     Reason: phx polyps       SCHEDULING STAFF: If the patient is between 39yrs-47yrs, please advise patient to confirm benefits/coverage with their insurance company for a routine screening colonoscopy, some insurance carriers will only cover at [address] or older  If the patient is over 66years old, please schedule an office visit  Does the patient want to see a Gastroenterologist prior to their procedure OR are they having any GI symptoms? no    Has the patient been hospitalized or had abdominal surgery in the past 6 months? no    Does the patient use supplemental oxygen? no    Does the patient take Coumadin, Lovenox, Plavix, Elliquis, Xarelto, or other blood thinning medication? no    Has the patient had a stroke, cardiac event, or stent placed in the past year? no    SCHEDULING STAFF: If patient answers NO to above questions, then schedule procedure  If patient answers YES to above questions, then schedule office appointment  If patient is between 45yrs - 49yrs, please advise patient that we will have to confirm benefits & coverage with their insurance company for a routine screening colonoscopy

## 2022-01-27 ENCOUNTER — APPOINTMENT (OUTPATIENT)
Dept: LAB | Facility: CLINIC | Age: 57
End: 2022-01-27
Payer: COMMERCIAL

## 2022-01-27 DIAGNOSIS — Z13.6 SCREENING FOR CARDIOVASCULAR CONDITION: ICD-10-CM

## 2022-01-27 DIAGNOSIS — Z00.00 ANNUAL PHYSICAL EXAM: ICD-10-CM

## 2022-01-27 DIAGNOSIS — Z13.1 SCREENING FOR DIABETES MELLITUS: ICD-10-CM

## 2022-01-27 DIAGNOSIS — Z12.5 SCREENING FOR PROSTATE CANCER: ICD-10-CM

## 2022-01-27 DIAGNOSIS — K63.5 POLYP OF COLON, UNSPECIFIED PART OF COLON, UNSPECIFIED TYPE: ICD-10-CM

## 2022-01-27 LAB
ALBUMIN SERPL BCP-MCNC: 4.3 G/DL (ref 3.5–5)
ALP SERPL-CCNC: 97 U/L (ref 46–116)
ALT SERPL W P-5'-P-CCNC: 47 U/L (ref 12–78)
ANION GAP SERPL CALCULATED.3IONS-SCNC: 3 MMOL/L (ref 4–13)
AST SERPL W P-5'-P-CCNC: 15 U/L (ref 5–45)
BASOPHILS # BLD AUTO: 0.06 THOUSANDS/ΜL (ref 0–0.1)
BASOPHILS NFR BLD AUTO: 1 % (ref 0–1)
BILIRUB SERPL-MCNC: 0.61 MG/DL (ref 0.2–1)
BILIRUB UR QL STRIP: NEGATIVE
BUN SERPL-MCNC: 16 MG/DL (ref 5–25)
CALCIUM SERPL-MCNC: 9.7 MG/DL (ref 8.3–10.1)
CHLORIDE SERPL-SCNC: 107 MMOL/L (ref 100–108)
CHOLEST SERPL-MCNC: 176 MG/DL
CLARITY UR: NORMAL
CO2 SERPL-SCNC: 28 MMOL/L (ref 21–32)
COLOR UR: YELLOW
CREAT SERPL-MCNC: 0.89 MG/DL (ref 0.6–1.3)
EOSINOPHIL # BLD AUTO: 0.12 THOUSAND/ΜL (ref 0–0.61)
EOSINOPHIL NFR BLD AUTO: 2 % (ref 0–6)
ERYTHROCYTE [DISTWIDTH] IN BLOOD BY AUTOMATED COUNT: 12.1 % (ref 11.6–15.1)
GFR SERPL CREATININE-BSD FRML MDRD: 96 ML/MIN/1.73SQ M
GLUCOSE P FAST SERPL-MCNC: 111 MG/DL (ref 65–99)
GLUCOSE UR STRIP-MCNC: NEGATIVE MG/DL
HCT VFR BLD AUTO: 47.8 % (ref 36.5–49.3)
HDLC SERPL-MCNC: 38 MG/DL
HGB BLD-MCNC: 16.5 G/DL (ref 12–17)
HGB UR QL STRIP.AUTO: NEGATIVE
IMM GRANULOCYTES # BLD AUTO: 0.02 THOUSAND/UL (ref 0–0.2)
IMM GRANULOCYTES NFR BLD AUTO: 0 % (ref 0–2)
KETONES UR STRIP-MCNC: NEGATIVE MG/DL
LDLC SERPL CALC-MCNC: 116 MG/DL (ref 0–100)
LEUKOCYTE ESTERASE UR QL STRIP: NEGATIVE
LYMPHOCYTES # BLD AUTO: 1.93 THOUSANDS/ΜL (ref 0.6–4.47)
LYMPHOCYTES NFR BLD AUTO: 27 % (ref 14–44)
MCH RBC QN AUTO: 30 PG (ref 26.8–34.3)
MCHC RBC AUTO-ENTMCNC: 34.5 G/DL (ref 31.4–37.4)
MCV RBC AUTO: 87 FL (ref 82–98)
MONOCYTES # BLD AUTO: 0.49 THOUSAND/ΜL (ref 0.17–1.22)
MONOCYTES NFR BLD AUTO: 7 % (ref 4–12)
NEUTROPHILS # BLD AUTO: 4.42 THOUSANDS/ΜL (ref 1.85–7.62)
NEUTS SEG NFR BLD AUTO: 63 % (ref 43–75)
NITRITE UR QL STRIP: NEGATIVE
NONHDLC SERPL-MCNC: 138 MG/DL
NRBC BLD AUTO-RTO: 0 /100 WBCS
PH UR STRIP.AUTO: 6 [PH]
PLATELET # BLD AUTO: 301 THOUSANDS/UL (ref 149–390)
PMV BLD AUTO: 9.8 FL (ref 8.9–12.7)
POTASSIUM SERPL-SCNC: 4.6 MMOL/L (ref 3.5–5.3)
PROT SERPL-MCNC: 8 G/DL (ref 6.4–8.2)
PROT UR STRIP-MCNC: NEGATIVE MG/DL
PSA SERPL-MCNC: 2 NG/ML (ref 0–4)
RBC # BLD AUTO: 5.5 MILLION/UL (ref 3.88–5.62)
SODIUM SERPL-SCNC: 138 MMOL/L (ref 136–145)
SP GR UR STRIP.AUTO: >=1.03 (ref 1–1.03)
T4 FREE SERPL-MCNC: 0.99 NG/DL (ref 0.76–1.46)
TRIGL SERPL-MCNC: 108 MG/DL
TSH SERPL DL<=0.05 MIU/L-ACNC: 4.4 UIU/ML (ref 0.36–3.74)
UROBILINOGEN UR QL STRIP.AUTO: 0.2 E.U./DL
WBC # BLD AUTO: 7.04 THOUSAND/UL (ref 4.31–10.16)

## 2022-01-27 PROCEDURE — 81003 URINALYSIS AUTO W/O SCOPE: CPT

## 2022-01-27 PROCEDURE — 80053 COMPREHEN METABOLIC PANEL: CPT

## 2022-01-27 PROCEDURE — 84443 ASSAY THYROID STIM HORMONE: CPT

## 2022-01-27 PROCEDURE — 36415 COLL VENOUS BLD VENIPUNCTURE: CPT

## 2022-01-27 PROCEDURE — G0103 PSA SCREENING: HCPCS

## 2022-01-27 PROCEDURE — 80061 LIPID PANEL: CPT

## 2022-01-27 PROCEDURE — 84439 ASSAY OF FREE THYROXINE: CPT

## 2022-01-27 PROCEDURE — 85025 COMPLETE CBC W/AUTO DIFF WBC: CPT

## 2022-02-01 ENCOUNTER — HOSPITAL ENCOUNTER (OUTPATIENT)
Dept: ULTRASOUND IMAGING | Facility: HOSPITAL | Age: 57
Discharge: HOME | End: 2022-02-01
Payer: COMMERCIAL

## 2022-02-01 DIAGNOSIS — Z82.49 FAMILY HISTORY OF ABDOMINAL AORTIC ANEURYSM: ICD-10-CM

## 2022-02-01 PROCEDURE — 76775 US EXAM ABDO BACK WALL LIM: CPT

## 2022-02-06 ENCOUNTER — OFFICE VISIT (OUTPATIENT)
Dept: URGENT CARE | Facility: CLINIC | Age: 57
End: 2022-02-06
Payer: COMMERCIAL

## 2022-02-06 VITALS
BODY MASS INDEX: 31.89 KG/M2 | TEMPERATURE: 99.5 F | WEIGHT: 180 LBS | HEIGHT: 63 IN | RESPIRATION RATE: 18 BRPM | OXYGEN SATURATION: 96 % | HEART RATE: 87 BPM

## 2022-02-06 DIAGNOSIS — R05.9 COUGH: ICD-10-CM

## 2022-02-06 DIAGNOSIS — J01.90 ACUTE NON-RECURRENT SINUSITIS, UNSPECIFIED LOCATION: Primary | ICD-10-CM

## 2022-02-06 PROCEDURE — 99213 OFFICE O/P EST LOW 20 MIN: CPT | Performed by: PHYSICIAN ASSISTANT

## 2022-02-06 RX ORDER — DOXYCYCLINE 100 MG/1
100 CAPSULE ORAL 2 TIMES DAILY
Qty: 14 CAPSULE | Refills: 0 | Status: SHIPPED | OUTPATIENT
Start: 2022-02-06 | End: 2022-02-13

## 2022-02-06 RX ORDER — FLUTICASONE PROPIONATE 50 MCG
2 SPRAY, SUSPENSION (ML) NASAL DAILY
Qty: 16 G | Refills: 0 | Status: SHIPPED | OUTPATIENT
Start: 2022-02-06

## 2022-02-06 RX ORDER — BENZONATATE 200 MG/1
200 CAPSULE ORAL 3 TIMES DAILY PRN
Qty: 20 CAPSULE | Refills: 0 | Status: SHIPPED | OUTPATIENT
Start: 2022-02-06 | End: 2022-06-07

## 2022-02-06 NOTE — PATIENT INSTRUCTIONS

## 2022-02-06 NOTE — PROGRESS NOTES
[facility]        NAME: Yandy Solomon is a 55 y o  male  : 1965    MRN: [id number]  DATE:  2022  TIME: 10:51 AM    Assessment and Plan   Acute non-recurrent sinusitis, unspecified location [J01 90]  1  Acute non-recurrent sinusitis, unspecified location  doxycycline monohydrate (MONODOX) 100 mg capsule    fluticasone (FLONASE) 50 mcg/act nasal spray   2  Cough  benzonatate (TESSALON) 200 MG capsule         Patient Instructions     Discussed condition with pt  He/she has acute sinusitis which I will treat with an oral abx, Flonase, Tessalon Perles and rec hydration, rest, discussed OTC cough/cold meds, and observation  Follow up with PCP in 3-5 days  Proceed to  ER if symptoms worsen  Chief Complaint     Chief Complaint   Patient presents with    Cold Like Symptoms     sneezing, coughing, ear pressure, post nasal gtt, deeper voice, symptoms x 4 days, taking mucinex with some relief          History of Present Illness       Patient presents with 4 day history of congestion, sneezing, cough, bilateral ear pain or pressure, PND, hoarseness  Denies shortness breath or wheezing, fever, chills, N/V/D, or known direct COVID exposure  Has taken Mucinex over-the-counter  Denies asthma or respiratory allergies  Does not smoke  Review of Systems   Review of Systems   Constitutional: Negative  HENT: Positive for congestion, ear pain, postnasal drip, sneezing and voice change  Respiratory: Positive for cough  Negative for shortness of breath and wheezing  Cardiovascular: Negative  Gastrointestinal: Negative  Genitourinary: Negative            Current Medications       Current Outpatient Medications:     multivitamin (THERAGRAN) TABS, Take 1 tablet by mouth daily, Disp: , Rfl:     benzonatate (TESSALON) 200 MG capsule, Take 1 capsule (200 mg total) by mouth 3 (three) times a day as needed for cough, Disp: 20 capsule, Rfl: 0    doxycycline monohydrate (MONODOX) 100 mg capsule, Take 1 capsule (100 mg total) by mouth 2 (two) times a day for 7 days Take with food (non-dairy)  , Disp: 14 capsule, Rfl: 0    fluticasone (FLONASE) 50 mcg/act nasal spray, 2 sprays into each nostril daily, Disp: 16 g, Rfl: 0    hydrOXYzine HCL (ATARAX) 25 mg tablet, Take 1 tablet (25 mg total) by mouth every 6 (six) hours as needed for itching (Patient not taking: Reported on 6/26/2021), Disp: 30 tablet, Rfl: 0    Current Allergies     Allergies as of 02/06/2022 - Reviewed 02/06/2022   Allergen Reaction Noted    Penicillins  05/21/2019            The following portions of the patient's history were reviewed and updated as appropriate: allergies, current medications, past family history, past medical history, past social history, past surgical history and problem list      Past Medical History:   Diagnosis Date    Chondromalacia of left patellofemoral joint        Past Surgical History:   Procedure Laterality Date    TONSILLECTOMY         Family History   Problem Relation Age of Onset    Heart defect Mother     Stroke Father     Coronary artery disease Father     Prostate cancer Father     Diabetes Maternal Grandmother     Diabetes Maternal Aunt     Diabetes Paternal Uncle          Medications have been verified  Objective   Pulse 87   Temp 99 5 °F (37 5 °C)   Resp 18   Ht 5' 3" (1 6 m)   Wt 81 6 kg (180 lb)   SpO2 96%   BMI 31 89 kg/m²   No LMP for male patient  Physical Exam     Physical Exam  Vitals reviewed  Constitutional:       General: He is not in acute distress  Appearance: He is well-developed  HENT:      Right Ear: Hearing, ear canal and external ear normal       Left Ear: Hearing, ear canal and external ear normal       Ears:      Comments: Bilateral dull TMs     Nose: Mucosal edema (B/L boggy turbinates) and congestion present        Mouth/Throat:      Mouth: Mucous membranes are moist       Pharynx: Posterior oropharyngeal erythema (PND) present  No oropharyngeal exudate  Tonsils: No tonsillar exudate  Cardiovascular:      Rate and Rhythm: Normal rate and regular rhythm  Heart sounds: Normal heart sounds  No murmur heard  Pulmonary:      Effort: Pulmonary effort is normal  No respiratory distress  Breath sounds: Normal breath sounds  Musculoskeletal:      Cervical back: Neck supple  Lymphadenopathy:      Cervical: No cervical adenopathy  Neurological:      Mental Status: He is alert and oriented to person, place, and time

## 2022-02-07 NOTE — PROGRESS NOTES
Weight Management Medical Nutrition Assessment  Aminata Cortez is here for medical meal planning  Current wt: 199 5 lbs  Works swing shift, one week works days, one week works nights  Long time periods between meals  Discussed importance of interval eating  Of note his TSH is currently elevated but PCP does not feel it is in need of management at this time  Discussed that weight loss can be more difficult when thyroid out of range  He is interested in using a partial meal replacement program to get into a good routine  Meal plan for partial provided along with other resources  Options for f/u reviewed  He will f/u in 4 months  Patient seen by Medical Provider in past 6 months:  no  Requested to schedule appointment with Medical Provider: No      Anthropometric Measurements  Start Weight (#): 199 5 lbs 2/14/22  Current Weight (#): -  TBW % Change from start weight: -  Ideal Body Weight (#): 142 9 lbs BMI 25 (63 25")  Goal Weight (#):  lbs   Highest: 199 5 lbs   Lowest: 155 lbs     Weight Loss History  Previous weight loss attempts: Exercise, keto    Food and Nutrition Related History  Wake up: Day shift 4:00 a m ; Night shift: 11:00 a m  Bed Time: 9:30 p m ; Night shift: 6 a m      Food Recall  Breakfast: 8:00 a m : eggs, veggies, sometimes meat, cheese OR 3:00: ~6 oz protein or turkey burger or cheese burger or chicken salad on roll, mixed veggies     Snack:   Lunch: 1:00 p m  s/w on chary's killer bread, turkey or ham, chipolte perez, bag of chips 8:00 p m  same   Snack: skip    Dinner: 6:30-7:00: see night shift 3 p m  OR 1:00 a m  s/w as above OR Heathy Choice power bowl   Snack: on day shift would skip OR 2:30-3:00 a m   sometimes greek or regular yogurt or cottage cheese or cinnamon applesauce     Beverages: water, diet soda and coffee   Volume of beverage intake: 8 glasses water, 4 diet coke     Weekends: Same  But might eat larger portions  Cravings: diet coke   Trouble area of day:     Frequency of Eating out: irregularly  Food restrictions:n/a  Cooking: self   Food Shopping: self    Physical Activity Intake  Activity:none currently  Frequency:n/a  Physical limitations/barriers to exercise: n/a    Estimated Needs  Energy  [employer] Energy Needs: BMR : 2851  1-2# loss weekly sedentary: [phone]            1-2# loss weekly lightly active: 8135-1132  Maintenance calories for sedentary activity level:  1967  Protein: 68-86 gm      (1 2-1 5g/kg IBW)  Fluid: 66 oz    (35mL/kg IBW)    Nutrition Diagnosis  Yes; Overweight/obesity  related to Excess energy intake as evidenced by  BMI more than normative standard for age and sex (obesity-grade II 35-39  9)       Nutrition Intervention    Nutrition Prescription  Calories:4902-4699  Protein:  gm  Carb:  gm    Meal Plan (Anderson/Pro/Carb)  Breakfast: 200, 27, 10  Snack: 100-160, 5-15, 10-20  Lunch: 200-350, 25-30, 10-30  Snack: 100-160, 5-15, 10-20  Dinner: 200-400, 25-30, 10-30  Snack: 0-150, 0-10, 10-20    Nutrition Education:    Calorie controlled menu  Lean protein food choices  Healthy snack options  Food journaling tips      Nutrition Counseling:  Strategies: meal planning, portion sizes, healthy snack choices, hydration, fiber intake, protein intake, exercise, food journal      Monitoring and Evaluation:  Evaluation criteria:  Energy Intake  Meet protein needs  Maintain adequate hydration  Monitor weekly weight  Meal planning/preparation  Food journal   Decreased portions at mealtimes and snacks  Physical activity     Barriers to learning:none  Readiness to change: Action:  (Changing behavior)  Comprehension: good  Expected Compliance: good

## 2022-02-14 ENCOUNTER — OFFICE VISIT (OUTPATIENT)
Dept: BARIATRICS | Facility: CLINIC | Age: 57
End: 2022-02-14

## 2022-02-14 VITALS — WEIGHT: 199.5 LBS | BODY MASS INDEX: 35.35 KG/M2 | HEIGHT: 63 IN

## 2022-02-14 DIAGNOSIS — R63.5 ABNORMAL WEIGHT GAIN: ICD-10-CM

## 2022-02-14 PROCEDURE — RECHECK

## 2022-02-14 PROCEDURE — WMDI30

## 2022-02-17 ENCOUNTER — TELEPHONE (OUTPATIENT)
Dept: GASTROENTEROLOGY | Facility: CLINIC | Age: 57
End: 2022-02-17

## 2022-02-18 DIAGNOSIS — Z86.010 HISTORY OF COLONIC POLYPS: Primary | ICD-10-CM

## 2022-02-18 RX ORDER — SODIUM PICOSULFATE, MAGNESIUM OXIDE, AND ANHYDROUS CITRIC ACID 10; 3.5; 12 MG/160ML; G/160ML; G/160ML
LIQUID ORAL
Qty: 320 ML | Refills: 0 | Status: SHIPPED | OUTPATIENT
Start: 2022-02-18 | End: 2022-02-24 | Stop reason: HOSPADM

## 2022-02-18 NOTE — TELEPHONE ENCOUNTER
Scheduled date of colonoscopy (as of today): 2/24/22  Physician performing colonoscopy: Dr Lisa Clements  Location of colonoscopy: [facility]  Bowel prep reviewed with patient: Clenpiq  Instructions reviewed with patient by: Shayla Lee  Clearances: none

## 2022-02-24 ENCOUNTER — ANESTHESIA (OUTPATIENT)
Dept: GASTROENTEROLOGY | Facility: AMBULATORY SURGERY CENTER | Age: 57
End: 2022-02-24

## 2022-02-24 ENCOUNTER — HOSPITAL ENCOUNTER (OUTPATIENT)
Dept: GASTROENTEROLOGY | Facility: AMBULATORY SURGERY CENTER | Age: 57
Discharge: HOME | End: 2022-02-24
Payer: COMMERCIAL

## 2022-02-24 ENCOUNTER — ANESTHESIA EVENT (OUTPATIENT)
Dept: GASTROENTEROLOGY | Facility: AMBULATORY SURGERY CENTER | Age: 57
End: 2022-02-24

## 2022-02-24 VITALS
TEMPERATURE: 97.9 F | OXYGEN SATURATION: 95 % | DIASTOLIC BLOOD PRESSURE: 75 MMHG | SYSTOLIC BLOOD PRESSURE: 111 MMHG | HEART RATE: 78 BPM | RESPIRATION RATE: 18 BRPM

## 2022-02-24 DIAGNOSIS — Z86.010 HISTORY OF COLON POLYPS: ICD-10-CM

## 2022-02-24 PROCEDURE — 88305 TISSUE EXAM BY PATHOLOGIST: CPT | Performed by: PATHOLOGY

## 2022-02-24 PROCEDURE — 45380 COLONOSCOPY AND BIOPSY: CPT | Performed by: INTERNAL MEDICINE

## 2022-02-24 RX ORDER — PROPOFOL 10 MG/ML
INJECTION, EMULSION INTRAVENOUS AS NEEDED
Status: DISCONTINUED | OUTPATIENT
Start: 2022-02-24 | End: 2022-02-24

## 2022-02-24 RX ORDER — PROPOFOL 10 MG/ML
INJECTION, EMULSION INTRAVENOUS CONTINUOUS PRN
Status: DISCONTINUED | OUTPATIENT
Start: 2022-02-24 | End: 2022-02-24

## 2022-02-24 RX ORDER — SODIUM CHLORIDE, SODIUM LACTATE, POTASSIUM CHLORIDE, CALCIUM CHLORIDE 600; 310; 30; 20 MG/100ML; MG/100ML; MG/100ML; MG/100ML
50 INJECTION, SOLUTION INTRAVENOUS CONTINUOUS
Status: DISCONTINUED | OUTPATIENT
Start: 2022-02-24 | End: 2022-02-28 | Stop reason: HOSPADM

## 2022-02-24 RX ORDER — LIDOCAINE HYDROCHLORIDE 10 MG/ML
INJECTION, SOLUTION EPIDURAL; INFILTRATION; INTRACAUDAL; PERINEURAL AS NEEDED
Status: DISCONTINUED | OUTPATIENT
Start: 2022-02-24 | End: 2022-02-24

## 2022-02-24 RX ADMIN — SODIUM CHLORIDE, SODIUM LACTATE, POTASSIUM CHLORIDE, CALCIUM CHLORIDE 50 ML/HR: 600; 310; 30; 20 INJECTION, SOLUTION INTRAVENOUS at 07:16

## 2022-02-24 RX ADMIN — PROPOFOL 120 MG: 10 INJECTION, EMULSION INTRAVENOUS at 07:29

## 2022-02-24 RX ADMIN — PROPOFOL 120 MCG/KG/MIN: 10 INJECTION, EMULSION INTRAVENOUS at 07:29

## 2022-02-24 RX ADMIN — LIDOCAINE HYDROCHLORIDE 50 MG: 10 INJECTION, SOLUTION EPIDURAL; INFILTRATION; INTRACAUDAL; PERINEURAL at 07:29

## 2022-02-24 NOTE — ANESTHESIA PREPROCEDURE EVALUATION
Procedure:  COLONOSCOPY    Relevant Problems   ANESTHESIA (within normal limits)      CARDIO (within normal limits)      PULMONARY (within normal limits)   (-) Sleep apnea   (-) Smoking   (-) URI (upper respiratory infection)    BMI 35    Physical Exam    Airway  Comment: Redundant tissue  Mallampati score: I  TM Distance: >3 FB  Neck ROM: full     Dental   No notable dental hx     Cardiovascular      Pulmonary      Other Findings        Anesthesia Plan  ASA Score- 2     Anesthesia Type- IV sedation with anesthesia with ASA Monitors  Additional Monitors:   Airway Plan:           Plan Factors-Exercise tolerance (METS): >4 METS  Chart reviewed  Existing labs reviewed  Patient summary reviewed  Patient is not a current smoker  Induction- intravenous  Postoperative Plan-     Informed Consent- Anesthetic plan and risks discussed with patient  I personally reviewed this patient with the CRNA  Discussed and agreed on the Anesthesia Plan with the CRNA  Dina Covarrubias

## 2022-02-24 NOTE — H&P
History and Physical -  [facility]  Zacarias Schmidt 55 y o  male MRN: [id number]    HPI: Zacarias Schmidt is a 55y o  year old male who presents for personal history of colon polyps    REVIEW OF SYSTEMS: Per the HPI, and otherwise unremarkable      Historical Information   Past Medical History:   Diagnosis Date    Chondromalacia of left patellofemoral joint     Colon polyp      Past Surgical History:   Procedure Laterality Date    COLONOSCOPY  2018    [place]    TONSILLECTOMY       Social History   Social History     Substance and Sexual Activity   Alcohol Use Yes    Alcohol/week: 2 0 standard drinks    Types: 2 Glasses of wine per week     Social History     Substance and Sexual Activity   Drug Use Never     Social History     Tobacco Use   Smoking Status Never Smoker   Smokeless Tobacco Never Used     Family History   Problem Relation Age of Onset    Heart defect Mother     Stroke Father     Coronary artery disease Father     Prostate cancer Father     Diabetes Maternal Grandmother     Diabetes Maternal Aunt     Diabetes Paternal Uncle        Meds/Allergies       Current Outpatient Medications:     fluticasone (FLONASE) 50 mcg/act nasal spray    multivitamin (THERAGRAN) TABS    Sod Picosulfate-Mag Ox-Cit Acd (Clenpiq) 10-3 5-12 MG-GM -GM/160ML SOLN    benzonatate (TESSALON) 200 MG capsule    hydrOXYzine HCL (ATARAX) 25 mg tablet    Current Facility-Administered Medications:     lactated ringers infusion, 50 mL/hr, Intravenous, Continuous, Continue from Pre-op at 02/24/22 5106    Allergies   Allergen Reactions    Penicillins      Patient was told by parents        Objective     /77   Pulse 85   Temp 97 9 °F (36 6 °C) (Temporal)   Resp 19   SpO2 95%     PHYSICAL EXAM    Gen: NAD AAOx3  Head: Normocephalic, Atraumatic  CV: S1S2 RRR no m/r/g  CHEST: Clear b/l no c/r/w  ABD: soft, +BS NT/ND  EXT: no edema    ASSESSMENT/PLAN:  This is a 55y o  year old male here for colonoscopy, and he is stable and optimized for his procedure

## 2022-02-24 NOTE — DISCHARGE INSTRUCTIONS
Diverticulosis   AMBULATORY CARE:   Diverticulosis  is a condition that causes small pockets called diverticula to form in your intestine  These pockets make it difficult for bowel movements to pass through your digestive system  Signs and symptoms:  Diverticulosis usually does not cause any signs or symptoms  It may cause any of the following in some people:  · Pain or discomfort in your lower abdomen    · Abdominal bloating    · Constipation or diarrhea    Seek care immediately if:   · You have severe pain on the left side of your lower abdomen  · Your bowel movements are bright or dark red  Call your doctor if:   · You have a fever and chills  · You feel dizzy or lightheaded  · You have nausea, or you are vomiting  · You have a change in your bowel movements  · You have questions or concerns about your condition or care  Treatment:  The goal of treatment is to manage any symptoms you have and prevent other problems such as diverticulitis  Diverticulitis is swelling or infection of the diverticula  Your healthcare provider may recommend any of the following:  · Eat a variety of high-fiber foods  High-fiber foods help you have regular bowel movements  High-fiber foods include cooked beans, fruits, vegetables, and some cereals  Most adults need 25 to 35 grams of fiber each day  Your healthcare provider may recommend that you have more  Ask your healthcare provider how much fiber you need  Increase fiber slowly  You may have abdominal discomfort, bloating, and gas if you add fiber to your diet too quickly  You may need to take a fiber supplement if you are not getting enough fiber from food  · Medicines  to soften your bowel movements may be given  You may also need medicines to treat symptoms such as bloating and pain  · Drink liquids as directed  You may need to drink 2 to 3 liters (8 to 12 cups) of liquids every day   Ask your healthcare provider how much liquid to drink each day and which liquids are best for you  · Apply heat  on your abdomen for 20 to 30 minutes every 2 hours for as many days as directed  Heat helps decrease pain and muscle spasms  Help prevent diverticulitis or other symptoms: The following may help decrease your risk for diverticulitis or symptoms, such as bleeding  Talk to your provider about these or other things you can do to prevent problems that may occur with diverticulosis  · Exercise regularly  Ask your healthcare provider about the best exercise plan for you  Exercise can help you have regular bowel movements  Get 30 minutes of exercise on most days of the week  · Maintain a healthy weight  Ask your healthcare provider what a healthy weight is for you  Ask him or her to help you create a weight loss plan if you are overweight  · Do not smoke  Nicotine and other chemicals in cigarettes increase your risk for diverticulitis  Ask your healthcare provider for information if you currently smoke and need help to quit  E-cigarettes or smokeless tobacco still contain nicotine  Talk to your healthcare provider before you use these products  · Ask your healthcare provider if it is safe to take NSAIDs  NSAIDs may increase your risk of diverticulitis  Follow up with your doctor as directed:  Write down your questions so you remember to ask them during your visits  © Copyright [employer] 2021 Information is for End User's use only and may not be sold, redistributed or otherwise used for commercial purposes  All illustrations and images included in CareNotes® are the copyrighted property of A D A M , Inc  or [facility] [place]   The above information is an  only  It is not intended as medical advice for individual conditions or treatments  Talk to your doctor, nurse or pharmacist before following any medical regimen to see if it is safe and effective for you    Colorectal Polyps   WHAT YOU NEED TO KNOW:   Colorectal polyps are small growths of tissue in the lining of the colon and rectum  Most polyps are hyperplastic polyps and are usually benign (noncancerous)  Certain types of polyps, called adenomatous polyps, may turn into cancer  DISCHARGE INSTRUCTIONS:   Follow up with your healthcare provider or gastroenterologist as directed: You may need to return for more tests, such as another colonoscopy  Write down your questions so you remember to ask them during your visits  Reduce your risk for colorectal polyps:   · Eat a variety of healthy foods:  Healthy foods include fruit, vegetables, whole-grain breads, low-fat dairy products, beans, lean meat, and fish  Ask if you need to be on a special diet  · Maintain a healthy weight:  Ask your healthcare provider if you need to lose weight and how much you need to lose  Ask for help with a weight loss program     · Exercise:  Begin to exercise slowly and do more as you get stronger  Talk with your healthcare provider before you start an exercise program      · Limit alcohol:  Your risk for polyps increases the more you drink  · Do not smoke: If you smoke, it is never too late to quit  Ask for information about how to stop  For support and more information:   · Nate Taylor ([facility])  [address], [place] [address]  Phone: 5- 004 - [phone]  Web Address: www digestive  niddk nih gov    Contact your healthcare provider or gastroenterologist if:   · You have a fever  · You have chills, a cough, or feel weak and achy  · You have abdominal pain that does not go away or gets worse after you take medicine  · Your abdomen is swollen  · You are losing weight without trying  · You have questions or concerns about your condition or care  Seek care immediately or call 911 if:   · You have sudden shortness of breath  · You have a fast heart rate, fast breathing, or are too dizzy to stand up      · You have severe abdominal pain  · You see blood in your bowel movement  © Copyright [address] Information is for End User's use only and may not be sold, redistributed or otherwise used for commercial purposes  All illustrations and images included in CareNotes® are the copyrighted property of [employer] M [employer] Inc  or Abelardo Apodaca  The above information is an  only  It is not intended as medical advice for individual conditions or treatments  Talk to your doctor, nurse or pharmacist before following any medical regimen to see if it is safe and effective for you

## 2022-02-24 NOTE — ANESTHESIA POSTPROCEDURE EVALUATION
Post-Op Assessment Note    CV Status:  Stable  Pain Score: 0    Pain management: adequate     Mental Status:  Alert and awake   Hydration Status:  Euvolemic   PONV Controlled:  Controlled   Airway Patency:  Patent      Post Op Vitals Reviewed: Yes      Staff: CRNA         No complications documented      BP   120/75   Temp      Pulse   86   Resp   16   SpO2   98%

## 2022-04-12 ENCOUNTER — TELEPHONE (OUTPATIENT)
Dept: OTHER | Facility: OTHER | Age: 57
End: 2022-04-12

## 2022-04-13 NOTE — TELEPHONE ENCOUNTER
PT  Called in requesting a call back regarding a referral to neurology  He is wondering when the referral will be placed

## 2022-04-14 DIAGNOSIS — H53.9 VISION CHANGES: Primary | ICD-10-CM

## 2022-04-28 ENCOUNTER — TELEPHONE (OUTPATIENT)
Dept: NEUROLOGY | Facility: CLINIC | Age: 57
End: 2022-04-28

## 2022-05-04 ENCOUNTER — OFFICE VISIT (OUTPATIENT)
Dept: URGENT CARE | Facility: CLINIC | Age: 57
End: 2022-05-04
Payer: COMMERCIAL

## 2022-05-04 VITALS
WEIGHT: 203 LBS | DIASTOLIC BLOOD PRESSURE: 90 MMHG | HEART RATE: 90 BPM | OXYGEN SATURATION: 97 % | SYSTOLIC BLOOD PRESSURE: 130 MMHG | TEMPERATURE: 99.6 F | BODY MASS INDEX: 35.97 KG/M2 | HEIGHT: 63 IN | RESPIRATION RATE: 16 BRPM

## 2022-05-04 DIAGNOSIS — R21 RASH: Primary | ICD-10-CM

## 2022-05-04 PROCEDURE — 99213 OFFICE O/P EST LOW 20 MIN: CPT | Performed by: PHYSICIAN ASSISTANT

## 2022-05-04 RX ORDER — PREDNISONE 10 MG/1
TABLET ORAL
Qty: 21 TABLET | Refills: 0 | Status: SHIPPED | OUTPATIENT
Start: 2022-05-04 | End: 2022-06-07

## 2022-05-04 NOTE — PROGRESS NOTES
NAME: Rhiannon Murillo is a 55 y o  male  : 1965    MRN: [id number]      Assessment and Plan   Rash [R21]  1  Rash  predniSONE 10 mg tablet     will cover with prednisone but if no improvement f/u with PCP  Sooner if anything changes or worsens       Patient Instructions     Patient Instructions   Prednisone as directed  Keep clean, covered and dry   Follow up if no improvement or anything changes/worsens     Proceed to ER if symptoms worsen  Chief Complaint     Chief Complaint   Patient presents with    Rash     Pt presents with skin rash on multiple sites of body  Right arm rash with drainage  History of Present Illness   Patient with no reported pmhx presents complaining of poison ivy rash x 3 days  Reports over the weekend he was cutting down trees which were covered in poison ivy  Has several red itchy patches to his b/l arms and stomach  Denies any fevers/ chills, nausea or vomiting  Has been keeping the area covered but reports the area at his right forearm has been weeping clear yellow fluid today  Denies any tongue or throat itching or swelling or any SOB or wheezing  Review of Systems   Review of Systems   Constitutional: Negative for chills and fever  Respiratory: Negative for cough, shortness of breath and wheezing  Cardiovascular: Negative for chest pain and palpitations  Gastrointestinal: Negative for diarrhea, nausea and vomiting  Musculoskeletal: Negative for myalgias  Skin: Positive for rash  Neurological: Negative for dizziness, weakness and numbness           Current Medications       Current Outpatient Medications:     benzonatate (TESSALON) 200 MG capsule, Take 1 capsule (200 mg total) by mouth 3 (three) times a day as needed for cough, Disp: 20 capsule, Rfl: 0    fluticasone (FLONASE) 50 mcg/act nasal spray, 2 sprays into each nostril daily (Patient not taking: Reported on 2022 ), Disp: 16 g, Rfl: 0    hydrOXYzine HCL (ATARAX) 25 mg tablet, Take 1 tablet (25 mg total) by mouth every 6 (six) hours as needed for itching (Patient not taking: Reported on 6/26/2021), Disp: 30 tablet, Rfl: 0    multivitamin (THERAGRAN) TABS, Take 1 tablet by mouth daily (Patient not taking: Reported on 5/4/2022 ), Disp: , Rfl:     predniSONE 10 mg tablet, Take 6 tablets today, 5 tablets tomorrow, 4 the next day, 3 the next day, 2 the following and 1 the last day- all with food, Disp: 21 tablet, Rfl: 0    Current Allergies     Allergies as of 05/04/2022 - Reviewed 05/04/2022   Allergen Reaction Noted    Penicillins  05/21/2019              Past Medical History:   Diagnosis Date    Chondromalacia of left patellofemoral joint     Colon polyp        Past Surgical History:   Procedure Laterality Date    COLONOSCOPY  2018    Adenoma    TONSILLECTOMY         Family History   Problem Relation Age of Onset    Heart defect Mother     Stroke Father     Coronary artery disease Father     Prostate cancer Father     Diabetes Maternal Grandmother     Diabetes Maternal Aunt     Diabetes Paternal Uncle          Medications have been verified  The following portions of the patient's history were reviewed and updated as appropriate: allergies, current medications, past family history, past medical history, past social history, past surgical history and problem list     Objective   /90   Pulse 90   Temp 99 6 °F (37 6 °C)   Resp 16   Ht 5' 3" (1 6 m)   Wt 92 1 kg (203 lb)   SpO2 97%   BMI 35 96 kg/m²      Physical Exam     Physical Exam  Vitals and nursing note reviewed  Constitutional:       General: He is not in acute distress  Appearance: Normal appearance  He is not ill-appearing, toxic-appearing or diaphoretic  Cardiovascular:      Rate and Rhythm: Normal rate and regular rhythm  Pulmonary:      Effort: Pulmonary effort is normal  No respiratory distress  Skin:     Capillary Refill: Capillary refill takes less than 2 seconds        Comments: Dry erythematous patch with ruptured bullae to the right forearm with clear fluid drainage  No tenderness to palpation  Full sensation to light touch  No edema  No lymphangitis  Similar patches to the left arm and reports stomach but declines exam there  Neurological:      Mental Status: He is alert and oriented to person, place, and time

## 2022-05-04 NOTE — PATIENT INSTRUCTIONS
Prednisone as directed  Keep clean, covered and dry   Follow up if no improvement or anything changes/worsens

## 2022-05-31 NOTE — PROGRESS NOTES
Weight Management Medical Nutrition Assessment  Lambert Forman is here for  meal planning  Current wt: 194 7  lbs  He has maintained his weight x ~4 months  Initially lost ~15 lbs but then issues with work schedule and little sleep got him off track  He then regained the weight and has been struggling to get back into that routine  Did find the meal replacements to be beneficial  Especially at night  He would like to resume partial program at this time  F/u in 2 months  Patient seen by Medical Provider in past 6 months:  no  Requested to schedule appointment with Medical Provider: No      Anthropometric Measurements  Start Weight (#): 199 5 lbs 2/14/22  Current Weight (#): 194 7 lbs   TBW % Change from start weight: -  Ideal Body Weight (#): 142 9 lbs BMI 25 (63 25")  Goal Weight (#):  lbs   Highest: 199 5 lbs   Lowest: 155 lbs     Weight Loss History  Previous weight loss attempts: Exercise, keto    Food and Nutrition Related History  Wake up: Day shift 4:00 a m ; Night shift: 11:00 a m  Bed Time: 9:30 p m ; Night shift: 6 a m      Food Recall  Breakfast: 7-8:00 a m : kailey turkey sausage egg white croissant OR 3:00: ~6 oz protein or turkey burger or cheese burger or chicken salad on roll, mixed veggies     Snack: skip   Lunch: 1:00 p m  pizza OR hoagie OR  8:00 p m  same   Snack: pistachios     Dinner: 6:30-7:00: see night shift 3 p m  OR 1:00 a m  s/w as above OR Heathy Choice power bowl   Snack: on day shift would skip OR 2:30-3:00 a m   sometimes greek or regular yogurt or cottage cheese or cinnamon applesauce     Beverages: water, diet soda and coffee   Volume of beverage intake: less water, 4-5 diet coke, 4 cups coffee     Weekends: Same  But might eat larger portions  Cravings: diet coke   Trouble area of day:     Frequency of Eating out: irregularly  Food restrictions:n/a  Cooking: self   Food Shopping: self    Physical Activity Intake  Activity:none currently  Frequency:n/a  Physical limitations/barriers to exercise: n/a    Estimated Needs  Energy  [employer] Energy Needs: BMR : 5571  1-2# loss weekly sedentary: [id number]            1-2# loss weekly lightly active: 8077-6079  Maintenance calories for sedentary activity level:  1967  Protein: 68-86 gm      (1 2-1 5g/kg IBW)  Fluid: 66 oz    (35mL/kg IBW)    Nutrition Diagnosis  Yes; Overweight/obesity  related to Excess energy intake as evidenced by  BMI more than normative standard for age and sex (obesity-grade II 35-39  9)       Nutrition Intervention    Nutrition Prescription  Calories:4306-1852  Protein:  gm  Carb:  gm    Meal Plan (Anderson/Pro/Carb)  Breakfast: 200, 27, 10  Snack: 100-160, 5-15, 10-20  Lunch: 200-350, 25-30, 10-30  Snack: 100-160, 5-15, 10-20  Dinner: 200-400, 25-30, 10-30  Snack: 0-150, 0-10, 10-20    Nutrition Education:    Calorie controlled menu  Lean protein food choices  Healthy snack options  Food journaling tips      Nutrition Counseling:  Strategies: meal planning, portion sizes, healthy snack choices, hydration, fiber intake, protein intake, exercise, food journal      Monitoring and Evaluation:  Evaluation criteria:  Energy Intake  Meet protein needs  Maintain adequate hydration  Monitor weekly weight  Meal planning/preparation  Food journal   Decreased portions at mealtimes and snacks  Physical activity     Barriers to learning:none  Readiness to change: Preparation:  (Getting ready to change)   Comprehension: good  Expected Compliance: fair

## 2022-06-06 ENCOUNTER — OFFICE VISIT (OUTPATIENT)
Dept: BARIATRICS | Facility: CLINIC | Age: 57
End: 2022-06-06

## 2022-06-06 VITALS — WEIGHT: 194.7 LBS | BODY MASS INDEX: 34.5 KG/M2 | HEIGHT: 63 IN

## 2022-06-06 DIAGNOSIS — R63.5 ABNORMAL WEIGHT GAIN: ICD-10-CM

## 2022-06-06 PROCEDURE — WMDI30

## 2022-06-06 PROCEDURE — RECHECK

## 2022-06-08 ENCOUNTER — OFFICE VISIT (OUTPATIENT)
Dept: FAMILY MEDICINE CLINIC | Facility: CLINIC | Age: 57
End: 2022-06-08
Payer: COMMERCIAL

## 2022-06-08 VITALS
BODY MASS INDEX: 34.73 KG/M2 | HEART RATE: 86 BPM | DIASTOLIC BLOOD PRESSURE: 82 MMHG | WEIGHT: 196 LBS | RESPIRATION RATE: 20 BRPM | OXYGEN SATURATION: 94 % | TEMPERATURE: 98.7 F | HEIGHT: 63 IN | SYSTOLIC BLOOD PRESSURE: 140 MMHG

## 2022-06-08 DIAGNOSIS — H53.9 CHANGE IN VISION: ICD-10-CM

## 2022-06-08 DIAGNOSIS — H57.9 EYE PRESSURE: ICD-10-CM

## 2022-06-08 DIAGNOSIS — R51.9 NEW ONSET HEADACHE: ICD-10-CM

## 2022-06-08 DIAGNOSIS — H53.9 ABNORMAL VISION OF BOTH EYES: Primary | ICD-10-CM

## 2022-06-08 DIAGNOSIS — H53.2 DOUBLE VISION WITH BOTH EYES OPEN: ICD-10-CM

## 2022-06-08 PROBLEM — M22.42 CHONDROMALACIA OF LEFT PATELLOFEMORAL JOINT: Status: RESOLVED | Noted: 2019-07-26 | Resolved: 2022-06-08

## 2022-06-08 PROBLEM — Z13.6 SCREENING FOR CARDIOVASCULAR CONDITION: Status: RESOLVED | Noted: 2019-05-21 | Resolved: 2022-06-08

## 2022-06-08 PROBLEM — Z23 NEED FOR SHINGLES VACCINE: Status: RESOLVED | Noted: 2019-05-21 | Resolved: 2022-06-08

## 2022-06-08 PROBLEM — M54.50 CHRONIC BILATERAL LOW BACK PAIN WITHOUT SCIATICA: Status: RESOLVED | Noted: 2019-05-21 | Resolved: 2022-06-08

## 2022-06-08 PROBLEM — Z23 NEED FOR TDAP VACCINATION: Status: RESOLVED | Noted: 2019-05-21 | Resolved: 2022-06-08

## 2022-06-08 PROBLEM — K63.5 COLON POLYP: Status: RESOLVED | Noted: 2021-11-04 | Resolved: 2022-06-08

## 2022-06-08 PROBLEM — L25.5 CONTACT DERMATITIS DUE TO PLANTS, EXCEPT FOOD: Status: RESOLVED | Noted: 2019-07-29 | Resolved: 2022-06-08

## 2022-06-08 PROBLEM — L98.9 SKIN LESION OF BACK: Status: RESOLVED | Noted: 2019-05-21 | Resolved: 2022-06-08

## 2022-06-08 PROBLEM — R05.9 COUGH: Status: RESOLVED | Noted: 2021-08-30 | Resolved: 2022-06-08

## 2022-06-08 PROBLEM — G89.29 CHRONIC PAIN OF LEFT KNEE: Status: RESOLVED | Noted: 2019-05-21 | Resolved: 2022-06-08

## 2022-06-08 PROBLEM — G89.29 CHRONIC BILATERAL LOW BACK PAIN WITHOUT SCIATICA: Status: RESOLVED | Noted: 2019-05-21 | Resolved: 2022-06-08

## 2022-06-08 PROBLEM — M25.562 CHRONIC PAIN OF LEFT KNEE: Status: RESOLVED | Noted: 2019-05-21 | Resolved: 2022-06-08

## 2022-06-08 PROCEDURE — 3008F BODY MASS INDEX DOCD: CPT | Performed by: FAMILY MEDICINE

## 2022-06-08 PROCEDURE — 99214 OFFICE O/P EST MOD 30 MIN: CPT | Performed by: FAMILY MEDICINE

## 2022-06-08 PROCEDURE — 3725F SCREEN DEPRESSION PERFORMED: CPT | Performed by: FAMILY MEDICINE

## 2022-06-08 PROCEDURE — 1036F TOBACCO NON-USER: CPT | Performed by: FAMILY MEDICINE

## 2022-06-08 NOTE — PROGRESS NOTES
Assessment/Plan:  Problem List Items Addressed This Visit    None     Visit Diagnoses     Abnormal vision of both eyes    -  Primary    Relevant Orders    MRI brain w wo contrast    Double vision with both eyes open        Relevant Orders    MRI brain w wo contrast    New onset headache        Relevant Orders    MRI brain w wo contrast    Eye pressure        Relevant Orders    MRI brain w wo contrast    Change in vision        Relevant Orders    MRI brain w wo contrast      We will send the patient for the imaging as ordered to rule out any type of space occupying lesion or any issues with the optic nerve due to his ongoing visual disturbance  He will follow-up with Neurology as scheduled on 7/11/2022  He will call with any new symptoms  We will follow up closely with the results of his testing and have further instructions at that time  Return for Next scheduled follow up  I spent 20 minutes during the visit reviewing the history from the patient, performing the examination, discussing the findings with the patient, providing counseling and education, and making a plan  I spent 10 minutes ordering referrals and testing and documenting  Subjective:   Chief Complaint   Patient presents with    Double-vision     Patient has been having double vision for a few months, he's in touch with opthalmology and neurology - Neuro wants imaging done prior to his visit  Patient ID: Roxana Thompson is a 55 y o  male presents today for for evaluation of double vision for a few months  Roxana Thompson is a 55 y o  male who presents with double vision and visual disturbance since November 2021  It started somewhat suddenly in November 2021  He never had a problem prior and has had no other issues  He has been seeing his optometrist and ophthalmologist regularly since then and has had the prescription changed in his glasses several times without improvement  He sees an opthamologist in [place]  Everything is constantly out of focus and he has difficulty with distance vision as well as readings,He states he has not had complete vision loss, but everything is out of focus  He has also experience double vision     He has noticed problems seeing and had his lenses changes several times  He was placed on prism lenses at one point with little change       He had one episode that the drove into the wrong [place] of traffic because he could not see properly  The patient is seeing [facility]  He is scheduled to see neurology on July 11, 2022, but they are requiring that he have some type of neuro imaging prior to this  He feels he can't see well with distance or reading  The prescriptions cannot change his vision  There is eye fatigue  There is no pain  There is a feeling of pressure in his eyes and occasional headache  He had had to have lens changed every 3-4 weeks  There are intermittent frontal headaches  There is no numbness or tingling and there is no loss of vision  There is no numbness or tingling in his extremities  He is having trouble reading and doing his job  There is faituge and eye pressure  There is light sensitivity  Eye Problem   Both eyes are affected  This is a recurrent problem  The current episode started more than 1 month ago  The problem occurs constantly  The problem has been gradually worsening  There was no injury mechanism  The pain is at a severity of 0/10  The patient is experiencing no pain  There is no known exposure to pink eye  He does not wear contacts  Associated symptoms include blurred vision and double vision  Pertinent negatives include no eye discharge, eye redness, fever, foreign body sensation, itching, nausea, photophobia, recent URI or vomiting       The following portions of the patient's history were reviewed and updated as appropriate: allergies, current medications, past family history, past medical history, past social history, past surgical history and problem list   Patient Active Problem List   Diagnosis    BMI 33 0-33 9,adult    Family history of abdominal aortic aneurysm     Past Medical History:   Diagnosis Date    Chondromalacia of left patellofemoral joint     Colon polyp      Past Surgical History:   Procedure Laterality Date    COLONOSCOPY  2018    Adenoma    TONSILLECTOMY       Allergies   Allergen Reactions    Penicillins      Patient was told by parents      Family History   Problem Relation Age of Onset    Heart defect Mother     Stroke Father     Coronary artery disease Father     Prostate cancer Father     Diabetes Maternal Grandmother     Diabetes Maternal Aunt     Diabetes Paternal Uncle      Social History     Socioeconomic History    Marital status: /Civil Union     Spouse name: Not on file    Number of children: Not on file    Years of education: Not on file    Highest education level: Not on file   Occupational History    Not on file   Tobacco Use    Smoking status: Never Smoker    Smokeless tobacco: Never Used   Vaping Use    Vaping Use: Never used   Substance and Sexual Activity    Alcohol use:  Yes     Alcohol/week: 2 0 standard drinks     Types: 2 Glasses of wine per week    Drug use: Never    Sexual activity: Yes     Partners: Female     Birth control/protection: None   Other Topics Concern    Not on file   Social History Narrative    Not on file     Social Determinants of Health     Financial Resource Strain: Not on file   Food Insecurity: Not on file   Transportation Needs: Not on file   Physical Activity: Sufficiently Active    Days of Exercise per Week: 3 days    Minutes of Exercise per Session: 90 min   Stress: No Stress Concern Present    Feeling of Stress : Not at all   Social Connections: Not on file   Intimate Partner Violence: Not At Risk    Fear of Current or Ex-Partner: No    Emotionally Abused: No    Physically Abused: No    Sexually Abused: No   Housing Stability: Not on file     Current Outpatient Medications on File Prior to Visit   Medication Sig Dispense Refill    fluticasone (FLONASE) 50 mcg/act nasal spray 2 sprays into each nostril daily 16 g 0    multivitamin (THERAGRAN) TABS Take 1 tablet by mouth daily       No current facility-administered medications on file prior to visit  Review of Systems   Constitutional: Negative  Negative for fever  HENT: Negative  Eyes: Positive for blurred vision, double vision, pain and visual disturbance  Negative for photophobia, discharge, redness and itching  Respiratory: Negative  Cardiovascular: Negative  Gastrointestinal: Negative  Negative for nausea and vomiting  Endocrine: Negative  Genitourinary: Negative  Musculoskeletal: Negative  Skin: Negative  Allergic/Immunologic: Negative  Neurological: Positive for headaches  Hematological: Negative  Psychiatric/Behavioral: Negative  Objective:  Vitals:    06/08/22 1501   BP: 140/82   BP Location: Left arm   Patient Position: Sitting   Cuff Size: Standard   Pulse: 86   Resp: 20   Temp: 98 7 °F (37 1 °C)   TempSrc: Tympanic   SpO2: 94%   Weight: 88 9 kg (196 lb)   Height: 5' 3 25" (1 607 m)     Body mass index is 34 45 kg/m²  Physical Exam  Vitals and nursing note reviewed  Constitutional:       General: He is not in acute distress  Appearance: He is well-developed  He is not diaphoretic  Eyes:      Pupils: Pupils are equal, round, and reactive to light  Neck:      Thyroid: No thyromegaly  Vascular: No JVD  Trachea: No tracheal deviation  Cardiovascular:      Rate and Rhythm: Normal rate and regular rhythm  Heart sounds: Normal heart sounds  No murmur heard  No friction rub  No gallop  Pulmonary:      Effort: Pulmonary effort is normal  No respiratory distress  Breath sounds: Normal breath sounds  No stridor  No wheezing or rales  Chest:      Chest wall: No tenderness     Abdominal:      General: Bowel sounds are normal  There is no distension  Palpations: Abdomen is soft  There is no mass  Tenderness: There is no abdominal tenderness  There is no guarding or rebound  Musculoskeletal:         General: Normal range of motion  Cervical back: Normal range of motion and neck supple  Lymphadenopathy:      Cervical: No cervical adenopathy  Skin:     General: Skin is warm and dry  Coloration: Skin is not pale  Findings: No erythema or rash  Neurological:      Mental Status: He is alert and oriented to person, place, and time  Cranial Nerves: No cranial nerve deficit  Motor: No abnormal muscle tone  Coordination: Coordination normal       Deep Tendon Reflexes: Reflexes are normal and symmetric   Reflexes normal

## 2022-06-23 ENCOUNTER — CONSULT (OUTPATIENT)
Dept: DERMATOLOGY | Facility: CLINIC | Age: 57
End: 2022-06-23
Payer: COMMERCIAL

## 2022-06-23 VITALS — TEMPERATURE: 100 F | WEIGHT: 199 LBS | BODY MASS INDEX: 34.97 KG/M2

## 2022-06-23 DIAGNOSIS — D22.60 MULTIPLE BENIGN MELANOCYTIC NEVI OF UPPER AND LOWER EXTREMITIES AND TRUNK: Primary | ICD-10-CM

## 2022-06-23 DIAGNOSIS — Z12.83 SKIN EXAM, SCREENING FOR CANCER: ICD-10-CM

## 2022-06-23 DIAGNOSIS — D22.70 MULTIPLE BENIGN MELANOCYTIC NEVI OF UPPER AND LOWER EXTREMITIES AND TRUNK: Primary | ICD-10-CM

## 2022-06-23 DIAGNOSIS — D18.01 CHERRY ANGIOMA: ICD-10-CM

## 2022-06-23 DIAGNOSIS — L82.1 SEBORRHEIC KERATOSIS: ICD-10-CM

## 2022-06-23 DIAGNOSIS — D48.5 NEOPLASM OF UNCERTAIN BEHAVIOR OF SKIN: ICD-10-CM

## 2022-06-23 DIAGNOSIS — D22.5 MULTIPLE BENIGN MELANOCYTIC NEVI OF UPPER AND LOWER EXTREMITIES AND TRUNK: Primary | ICD-10-CM

## 2022-06-23 PROCEDURE — 11102 TANGNTL BX SKIN SINGLE LES: CPT | Performed by: STUDENT IN AN ORGANIZED HEALTH CARE EDUCATION/TRAINING PROGRAM

## 2022-06-23 PROCEDURE — 88305 TISSUE EXAM BY PATHOLOGIST: CPT | Performed by: STUDENT IN AN ORGANIZED HEALTH CARE EDUCATION/TRAINING PROGRAM

## 2022-06-23 PROCEDURE — 99204 OFFICE O/P NEW MOD 45 MIN: CPT | Performed by: STUDENT IN AN ORGANIZED HEALTH CARE EDUCATION/TRAINING PROGRAM

## 2022-06-23 NOTE — PATIENT INSTRUCTIONS
Assessment and Plan:  Based on a thorough discussion of this condition and the management approach to it (including a comprehensive discussion of the known risks, side effects and potential benefits of treatment), the patient (family) agrees to implement the following specific plan:  Reassured benign   Monitor for changes  When outside we recommend using a wide brim hat, sunglasses, long sleeve and pants, sunscreen with SPF 70+ with reapplication every 2 hours, or SPF specific clothing   Recommend routine skin exam      Melanocytic Nevi  Melanocytic nevi ("moles") are tan or brown, raised or flat areas of the skin which have an increased number of melanocytes  Melanocytes are the cells in our body which make pigment and account for skin color  Some moles are present at birth (I e , "congenital nevi"), while others come up later in life (i e , "acquired nevi")  The sun can stimulate the body to make more moles  Sunburns are not the only thing that triggers more moles  Chronic sun exposure can do it too  Clinically distinguishing a healthy mole from melanoma may be difficult, even for experienced dermatologists  The "ABCDE's" of moles have been suggested as a means of helping to alert a person to a suspicious mole and the possible increased risk of melanoma  The suggestions for raising alert are as follows:    Asymmetry: Healthy moles tend to be symmetric, while melanomas are often asymmetric  Asymmetry means if you draw a line through the mole, the two halves do not match in color, size, shape, or surface texture  Asymmetry can be a result of rapid enlargement of a mole, the development of a raised area on a previously flat lesion, scaling, ulceration, bleeding or scabbing within the mole  Any mole that starts to demonstrate "asymmetry" should be examined promptly by a board certified dermatologist      Border: Healthy moles tend to have discrete, even borders    The border of a melanoma often blends into the normal skin and does not sharply delineate the mole from normal skin  Any mole that starts to demonstrate "uneven borders" should be examined promptly by a board certified dermatologist      Color: Healthy moles tend to be one color throughout  Melanomas tend to be made up of different colors ranging from dark black, blue, white, or red  Any mole that demonstrates a color change should be examined promptly by a board certified dermatologist      Diameter: Healthy moles tend to be smaller than 0 6 cm in size; an exception are "congenital nevi" that can be larger  Melanomas tend to grow and can often be greater than 0 6 cm (1/4 of an inch, or the size of a pencil eraser)  This is only a guideline, and many normal moles may be larger than 0 6 cm without being unhealthy  Any mole that starts to change in size (small to bigger or bigger to smaller) should be examined promptly by a board certified dermatologist      Evolving: Healthy moles tend to "stay the same "  Melanomas may often show signs of change or evolution such as a change in size, shape, color, or elevation  Any mole that starts to itch, bleed, crust, burn, hurt, or ulcerate or demonstrate a change or evolution should be examined promptly by a board certified dermatologist       Dysplastic Nevi  Dysplastic moles are moles that fit the ABCDE rules of melanoma but are not identified as melanomas when examined under the microscope  They may indicate an increased risk of melanoma in that person  If there is a family history of melanoma, most experts agree that the person may be at an increased risk for developing a melanoma  Experts still do not agree on what dysplastic moles mean in patients without a personal or family history of melanoma  Dysplastic moles are usually larger than common moles and have different colors within it with irregular borders  The appearance can be very similar to a melanoma   Biopsies of dysplastic moles may show abnormalities which are different from a regular mole  Melanoma  Malignant melanoma is a type of skin cancer that can be deadly if it spreads throughout the body  The incidence of melanoma in the [place] is growing faster than any other cancer  Melanoma usually grows near the surface of the skin for a period of time, and then begins to grow deeper into the skin  Once it grows deeper into the skin, the risk of spread to other organs greatly increases  Therefore, early detection and removal of a malignant melanoma may result in a better chance at a complete cure; removal after the tumor has spread may not be as effective, leading to worse clinical outcomes such as death  The true rate of nevus transformation into a melanoma is unknown  It has been estimated that the lifetime risk for any acquired melanocytic nevus on any 21year-old individual transforming into melanoma by age [de-identified] is 0 03% (1 in 3,164) for men and 0 009% (1 in 10,800) for women  The appearance of a "new mole" remains one of the most reliable methods for identifying a malignant melanoma  Occasionally, melanomas appear as rapidly growing, blue-black, dome-shaped bumps within a previous mole or previous area of normal skin  Other times, melanomas are suspected when a mole suddenly appears or changes  Itching, burning, or pain in a pigmented lesion should increase suspicion, but most patients with early melanoma have no skin discomfort whatsoever  Melanoma can occur anywhere on the skin, including areas that are difficult for self-examination  Many melanomas are first noticed by other family members  Suspicious-looking moles may be removed for microscopic examination  You may be able to prevent death from melanoma by doing two simple things:    Try to avoid unnecessary sun exposure and protect your skin when it is exposed to the sun    People who live near the equator, people who have intermittent exposures to large amounts of sun, and people who have had sunburns in childhood or adolescence have an increased risk for melanoma  Sun sense and vigilant sun protection may be keys to helping to prevent melanoma  We recommend wearing UPF-rated sun protective clothing and sunglasses whenever possible and applying a moisturizer-sunscreen combination product (SPF 50+) such as Neutrogena Daily Defense to sun exposed areas of skin at least three times a day  Have your moles regularly examined by a board certified dermatologist AND by yourself or a family member/friend at home  We recommend that you have your moles examined at least once a year by a board certified dermatologist   Use your birthday as an annual reminder to have your "Birthday Suit" (I e , your skin) examined; it is a nice birthday gift to yourself to know that your skin is healthy appearing! Additionally, at-home self examinations may be helpful for detecting a possible melanoma  Use the ABCDEs we discussed and check your moles once a month at home  Assessment and Plan:  Based on a thorough discussion of this condition and the management approach to it (including a comprehensive discussion of the known risks, side effects and potential benefits of treatment), the patient (family) agrees to implement the following specific plan:  Reassured benign     Assessment and Plan:    Cherry angioma, also known as [place] spots, are benign vascular skin lesions  A "cherry angioma" is a firm red, blue or purple papule, 0 1-1 cm in diameter  When thrombosed, they can appear black in colour until evaluated with a dermatoscope when the red or purple colour is more easily seen  Cherry angioma may develop on any part of the body but most often appear on the scalp, face, lips and trunk  An angioma is due to proliferating endothelial cells; these are the cells that line the inside of a blood vessel      Angiomas can arise in early life or later in life; the most common type of angioma is a cherry angioma  Cherry angiomas are very common in males and females of any age or race  They are more noticeable in white skin than in skin of colour  They markedly increase in number from about the age of 36  There may be a family history of similar lesions  Eruptive cherry angiomas have been rarely reported to be associated with internal malignancy  The cause of angiomas is unknown  Genetic analysis of cherry angiomas has shown that they frequently carry specific somatic missense mutations in the GNAQ and GNA11 (Q209H) genes, which are involved in other vascular and melanocytic proliferations  Cherry angioma is usually diagnosed clinically and no investigations are necessary for the majority of lesions  It has a characteristic red-clod or lobular pattern on dermatoscopy (called lacunar pattern using conventional pattern analysis)  When there is uncertainty about the diagnosis, a biopsy may be performed  The angioma is composed of venules in a thickened papillary dermis  Collagen bundles may be prominent between the lobules  Cherry angiomas are harmless, so they do not usually have to be treated  Occasionally, they are removed to exclude a malignant skin lesion such as a nodular melanoma or because they are irritated or bleeding (and a subsequent risk for infection)  To decrease friction over the lesions, we recommend Neutrogena Daily Defense SPF 50+ at least 3 times a day  Assessment and Plan:  Based on a thorough discussion of this condition and the management approach to it (including a comprehensive discussion of the known risks, side effects and potential benefits of treatment), the patient (family) agrees to implement the following specific plan:  Reassured benign     Seborrheic Keratosis  A seborrheic keratosis is a harmless warty spot that appears during adult life as a common sign of skin aging    Seborrheic keratoses can arise on any area of skin, covered or uncovered, with the usual exception of the palms and soles  They do not arise from mucous membranes  Seborrheic keratoses can have highly variable appearance  Seborrheic keratoses are extremely common  It has been estimated that over 90% of adults over the age of 61 years have one or more of them  They occur in males and females of all races, typically beginning to erupt in the 35s or 45s  They are uncommon under the age of 21 years  The precise cause of seborrhoeic keratoses is not known  Seborrhoeic keratoses are considered degenerative in nature  As time goes by, seborrheic keratoses tend to become more numerous  Some people inherit a tendency to develop a very large number of them; some people may have hundreds of them  The name "seborrheic keratosis" is misleading, because these lesions are not limited to a seborrhoeic distribution (scalp, mid-face, chest, upper back), nor are they formed from sebaceous glands, nor are they associated with sebum -- which is greasy  Seborrheic keratosis may also be called "SK," "Seb K," "basal cell papilloma," "senile wart," or "barnacle "      Researchers have noted:  Eruptive seborrhoeic keratoses can follow sunburn or dermatitis  Skin friction may be the reason they appear in body folds  Viral cause (e g , human papillomavirus) seems unlikely  Stable and clonal mutations or activation of FRFR3, PIK3CA, CELESTE, AKT1 and EGFR genes are found in seborrhoeic keratoses  Seborrhoeic keratosis can arise from solar lentigo  FRFR3 mutations also arise in solar lentigines  These mutations are associated with increased age and location on the head and neck, suggesting a role of ultraviolet radiation in these lesions  Seborrheic keratoses do not harbour tumour suppressor gene mutations  Epidermal growth factor receptor inhibitors, which are used to treat some cancers, often result in an increase in verrucal (warty) keratoses  There is no easy way to remove multiple lesions on a single occasion  Unless a specific lesion is "inflamed" and is causing pain or stinging/burning or is bleeding, most insurance companies do not authorize treatment  Assessment and Plan:  I have discussed with the patient that a sample of skin via a "skin biopsy would be potentially helpful to further make a specific diagnosis under the microscope  Based on a thorough discussion of this condition and the management approach to it (including a comprehensive discussion of the known risks, side effects and potential benefits of treatment), the patient (family) agrees to implement the following specific plan:    Procedure:  Skin Biopsy  After a thorough discussion of treatment options and risk/benefits/alternatives (including but not limited to local pain, scarring, dyspigmentation, blistering, possible superinfection, and inability to confirm a diagnosis via histopathology), verbal and written consent were obtained and portion of the rash was biopsied for tissue sample  See below for consent that was obtained from patient and subsequent Procedure Note  INFORMED CONSENT DISCUSSION AND POST-OPERATIVE INSTRUCTIONS FOR PATIENT    I   RATIONALE FOR PROCEDURE  I understand that a skin biopsy allows the Dermatologist to test a lesion or rash under the microscope to obtain a diagnosis  It usually involves numbing the area with numbing medication and removing a small piece of skin; sometimes the area will be closed with sutures  In this specific procedure, sutures are not usually needed  If any sutures are placed, then they are usually need to be removed in 2 weeks or less  I understand that my Dermatologist recommends that a skin "shave" biopsy be performed today  A local anesthetic, similar to the kind that a dentist uses when filling a cavity, will be injected with a very small needle into the skin area to be sampled    The injected skin and tissue underneath "will go to sleep and become numb so no pain should be felt afterwards  An instrument shaped like a tiny "razor blade" (shave biopsy instrument) will be used to cut a small piece of tissue and skin from the area so that a sample of tissue can be taken and examined more closely under the microscope  A slight amount of bleeding will occur, but it will be stopped with direct pressure and a pressure bandage and any other appropriate methods  I understands that a scar will form where the wound was created  Surgical ointment will be applied to help protect the wound  Sutures are not usually needed  II   RISKS AND POTENTIAL COMPLICATIONS   I understand the risks and potential complications of a skin biopsy include but are not limited to the following:  Bleeding  Infection  Pain  Scar/keloid  Skin discoloration  Incomplete Removal  Recurrence  Nerve Damage/Numbness/Loss of Function  Allergic Reaction to Anesthesia  Biopsies are diagnostic procedures and based on findings additional treatment or evaluation may be required  Loss or destruction of specimen resulting in no additional findings    My Dermatologist has explained to me the nature of the condition, the nature of the procedure, and the benefits to be reasonably expected compared with alternative approaches  My Dermatologist has discussed the likelihood of major risks or complications of this procedure including the specific risks listed above, such as bleeding, infection, and scarring/keloid  I understand that a scar is expected after this procedure  I understand that my physician cannot predict if the scar will form a "keloid," which extends beyond the borders of the wound that is created  A keloid is a thick, painful, and bumpy scar  A keloid can be difficult to treat, as it does not always respond well to therapy, which includes injecting cortisone directly into the keloid every few weeks  While this usually reduces the pain and size of the scar, it does not eliminate it        I understand that photographs may be taken before and after the procedure  These will be maintained as part of the medical providers confidential records and may not be made available to me  I further authorize the medical provider to use the photographs for teaching purposes or to illustrate scientific papers, books, or lectures if in his/her judgment, medical research, education, or science may benefit from its use  I have had an opportunity to fully inquire about the risks and benefits of this procedure and its alternatives  I have been given ample time and opportunity to ask questions and to seek a second opinion if I wished to do so  I acknowledge that there have specifically been no guarantees as to the cosmetic results from the procedure  I am aware that with any procedure there is always the possibility of an unexpected complication  III  POST-PROCEDURAL CARE (WHAT YOU WILL NEED TO DO "AFTER THE BIOPSY" TO OPTIMIZE HEALING)    Keep the area clean and dry  Try NOT to remove the bandage or get it wet for the first 24 hours  Gently clean the area and apply surgical ointment (such as Vaseline petrolatum ointment, which is available "over the counter" and not a prescription) to the biopsy site for up to 2 weeks straight  This acts to protect the wound from the outside world  Sutures are not usually placed in this procedure  If any sutures were placed, return for suture removal as instructed (generally 1 week for the face, 2 weeks for the body)  Take Acetaminophen (Tylenol) for discomfort, if no contraindications  Ibuprofen or aspirin could make bleeding worse  Call our office immediately for signs of infection: fever, chills, increased redness, warmth, tenderness, discomfort/pain, or pus or foul smell coming from the wound  WHAT TO DO IF THERE IS ANY BLEEDING? If a small amount of bleeding is noticed, place a clean cloth over the area and apply firm pressure for ten minutes    Check the wound after 10 minutes of direct pressure  If bleeding persists, try one more time for an additional 10 minutes of direct pressure on the area  If the bleeding becomes heavier or does not stop after the second attempt, or if you have any other questions about this procedure, then please call your [facility] [place] [facility] Dermatologist by calling [phone] (SKIN)  I hereby acknowledge that I have reviewed and verified the site with my Dermatologist and have requested and authorized my Dermatologist to proceed with the procedure

## 2022-06-23 NOTE — PROGRESS NOTES
[facility] Note     Patient Name: Rangel Fagan  Encounter Date: 06/23/22       Have you been cared for by a [facility] Dermatologist in the last 3 years and, if so, which one? No    · Have you traveled outside of the [address] [place] [address] in the past 3 months or outside of the [facility] area in the last 2 weeks? No     May we call your Preferred Phone number to discuss your specific medical information? Yes     May we leave a detailed message that includes your specific medical information? Yes      Today's Chief Concerns:   Concern #1:  Skin Exam      Past Medical History:  Have you personally ever had or currently have any of the following? · Skin cancer (such as Melanoma, Basal Cell Carcinoma, Squamous Cell Carcinoma? (If Yes, please provide more detail)- No  · Eczema: No  · Psoriasis: No  · HIV/AIDS: No  · Hepatitis B or C: No  · Tuberculosis: No  · Systemic Immunosuppression such as Diabetes, Biologic or Immunotherapy, Chemotherapy, Organ Transplantation, Bone Marrow Transplantation (If YES, please provide more detail): No  · Radiation Treatment (If YES, please provide more detail): No  · Any other major medical conditions/concerns? (If Yes, which types)- No    Social History:     What is/was your primary occupation? [employer]      What are your hobbies/past-times? Family     Family History:  Have any of your "first degree relatives" (parent, brother, sister, or child) had any of the following       · Skin cancer such as Melanoma or Merkel Cell Carcinoma or Pancreatic Cancer? No  · Eczema, Asthma, Hay Fever or Seasonal Allergies: YES, Brother- Hay fever  · Psoriasis or Psoriatic Arthritis: No  · Do any other medical conditions seem to run in your family? If Yes, what condition and which relatives?   No    Current Medications:         Current Outpatient Medications:     fluticasone (FLONASE) 50 mcg/act nasal spray, 2 sprays into each nostril daily, Disp: 16 g, Rfl: 0    multivitamin (THERAGRAN) TABS, Take 1 tablet by mouth daily, Disp: , Rfl:       Review of Systems:  Have you recently had or currently have any of the following? If YES, what are you doing for the problem? · Fever, chills or unintended weight loss: No  · Sudden loss or change in your vision: No  · Nausea, vomiting or blood in your stool: No  · Painful or swollen joints: No  · Wheezing or cough: No  · Changing mole or non-healing wound: No  · Nosebleeds: No  · Excessive sweating: No  · Easy or prolonged bleeding? No  · Over the last 2 weeks, how often have you been bothered by the following problems? · Taking little interest or pleasure in doing things: 1 - Not at All  · Feeling down, depressed, or hopeless: 1 - Not at All  · Rapid heartbeat with epinephrine:  No    · FEMALES ONLY:    · Are you pregnant or planning to become pregnant? N/A  · Are you currently or planning to be nursing or breast feeding? N/A    · Any known allergies? Allergies   Allergen Reactions    Penicillins      Patient was told by parents    ·       Physical Exam:     Was a chaperone (Derm Clinical Assistant) present throughout the entire Physical Exam? Yes     Did the Dermatology Team specifically  the patient on the importance of a Full Skin Exam to be sure that nothing is missed clinically? Yes}  o Did the patient ultimately request or accept a Full Skin Exam?  Yes  o Did the patient specifically refuse to have the areas "under-the-bra" examined by the Dermatologist? No  o Did the patient specifically refuse to have the areas "under-the-underwear" examined by the Dermatologist? No    CONSTITUTIONAL:   There were no vitals filed for this visit      PSYCH: Normal mood and affect  EYES: Normal conjunctiva  ENT: Normal lips and oral mucosa  CARDIOVASCULAR: No edema  RESPIRATORY: Normal respirations  HEME/LYMPH/IMMUNO:  No regional lymphadenopathy except as noted below in "ASSESSMENT AND PLAN BY DIAGNOSIS"    SKIN:  FULL ORGAN SYSTEM EXAM   Hair, Scalp, Ears, Face Normal except as noted below in Assessment   Neck, Cervical Chain Nodes Normal except as noted below in Assessment   Right Arm/Hand/Fingers Normal except as noted below in Assessment   Left Arm/Hand/Fingers Normal except as noted below in Assessment   Chest/Breasts/Axillae Viewed areas Normal except as noted below in Assessment   Abdomen, Umbilicus Normal except as noted below in Assessment   Back/Spine Normal except as noted below in Assessment   /Buttocks Normal except as noted below in Assessment   Right Leg, Foot, Toes Normal except as noted below in Assessment   Left Leg, Foot, Toes Normal except as noted below in Assessment    pt deferred groin/genitalia exam    Assessment and Plan by Diagnosis:    History of Present Condition:  Patient is present to establish care no spot of concern no personal or family hx of skin cancer  MELANOCYTIC NEVI ("Moles")    Physical Exam:   Anatomic Location Affected:    Trunk and extremities    Morphological Description:  Scattered, 1-4mm round to ovoid, symmetrical-appearing, even bordered, skin colored to dark brown macules/papules, mostly in sun-exposed areas   Pertinent Positives:   Pertinent Negatives:     Additional History of Present Condition:  Discovered upon skin exam     Assessment and Plan:  Based on a thorough discussion of this condition and the management approach to it (including a comprehensive discussion of the known risks, side effects and potential benefits of treatment), the patient (family) agrees to implement the following specific plan:      Monitor for changes   When outside we recommend using a wide brim hat, sunglasses, long sleeve and pants, sunscreen with SPF 34+ with reapplication every 2 hours, or SPF specific clothing    Recommend routine skin exam      Melanocytic Nevi  Melanocytic nevi ("moles") are tan or brown, raised or flat areas of the skin which have an increased number of melanocytes  Melanocytes are the cells in our body which make pigment and account for skin color  Some moles are present at birth (I e , "congenital nevi"), while others come up later in life (i e , "acquired nevi")  The sun can stimulate the body to make more moles  Sunburns are not the only thing that triggers more moles  Chronic sun exposure can do it too  Clinically distinguishing a healthy mole from melanoma may be difficult, even for experienced dermatologists  The "ABCDE's" of moles have been suggested as a means of helping to alert a person to a suspicious mole and the possible increased risk of melanoma  The suggestions for raising alert are as follows:    Asymmetry: Healthy moles tend to be symmetric, while melanomas are often asymmetric  Asymmetry means if you draw a line through the mole, the two halves do not match in color, size, shape, or surface texture  Asymmetry can be a result of rapid enlargement of a mole, the development of a raised area on a previously flat lesion, scaling, ulceration, bleeding or scabbing within the mole  Any mole that starts to demonstrate "asymmetry" should be examined promptly by a board certified dermatologist      Border: Healthy moles tend to have discrete, even borders  The border of a melanoma often blends into the normal skin and does not sharply delineate the mole from normal skin  Any mole that starts to demonstrate "uneven borders" should be examined promptly by a board certified dermatologist      Color: Healthy moles tend to be one color throughout  Melanomas tend to be made up of different colors ranging from dark black, blue, white, or red  Any mole that demonstrates a color change should be examined promptly by a board certified dermatologist      Diameter: Healthy moles tend to be smaller than 0 6 cm in size; an exception are "congenital nevi" that can be larger    Melanomas tend to grow and can often be greater than 0 6 cm (1/4 of an inch, or the size of a pencil eraser)  This is only a guideline, and many normal moles may be larger than 0 6 cm without being unhealthy  Any mole that starts to change in size (small to bigger or bigger to smaller) should be examined promptly by a board certified dermatologist      Evolving: Healthy moles tend to "stay the same "  Melanomas may often show signs of change or evolution such as a change in size, shape, color, or elevation  Any mole that starts to itch, bleed, crust, burn, hurt, or ulcerate or demonstrate a change or evolution should be examined promptly by a board certified dermatologist       Dysplastic Nevi  Dysplastic moles are moles that fit the ABCDE rules of melanoma but are not identified as melanomas when examined under the microscope  They may indicate an increased risk of melanoma in that person  If there is a family history of melanoma, most experts agree that the person may be at an increased risk for developing a melanoma  Experts still do not agree on what dysplastic moles mean in patients without a personal or family history of melanoma  Dysplastic moles are usually larger than common moles and have different colors within it with irregular borders  The appearance can be very similar to a melanoma  Biopsies of dysplastic moles may show abnormalities which are different from a regular mole  Melanoma  Malignant melanoma is a type of skin cancer that can be deadly if it spreads throughout the body  The incidence of melanoma in the [place] is growing faster than any other cancer  Melanoma usually grows near the surface of the skin for a period of time, and then begins to grow deeper into the skin  Once it grows deeper into the skin, the risk of spread to other organs greatly increases   Therefore, early detection and removal of a malignant melanoma may result in a better chance at a complete cure; removal after the tumor has spread may not be as effective, leading to worse clinical outcomes such as death  The true rate of nevus transformation into a melanoma is unknown  It has been estimated that the lifetime risk for any acquired melanocytic nevus on any 21year-old individual transforming into melanoma by age [de-identified] is 0 03% (1 in 3,164) for men and 0 009% (1 in 10,800) for women  The appearance of a "new mole" remains one of the most reliable methods for identifying a malignant melanoma  Occasionally, melanomas appear as rapidly growing, blue-black, dome-shaped bumps within a previous mole or previous area of normal skin  Other times, melanomas are suspected when a mole suddenly appears or changes  Itching, burning, or pain in a pigmented lesion should increase suspicion, but most patients with early melanoma have no skin discomfort whatsoever  Melanoma can occur anywhere on the skin, including areas that are difficult for self-examination  Many melanomas are first noticed by other family members  Suspicious-looking moles may be removed for microscopic examination  You may be able to prevent death from melanoma by doing two simple things:    1  Try to avoid unnecessary sun exposure and protect your skin when it is exposed to the sun  People who live near the equator, people who have intermittent exposures to large amounts of sun, and people who have had sunburns in childhood or adolescence have an increased risk for melanoma  Sun sense and vigilant sun protection may be keys to helping to prevent melanoma  We recommend wearing UPF-rated sun protective clothing and sunglasses whenever possible and applying a moisturizer-sunscreen combination product (SPF 50+) such as Neutrogena Daily Defense to sun exposed areas of skin at least three times a day  2  Have your moles regularly examined by a board certified dermatologist AND by yourself or a family member/friend at home    We recommend that you have your moles examined at least once a year by a board certified dermatologist   Use your birthday as an annual reminder to have your "Birthday Suit" (I e , your skin) examined; it is a nice birthday gift to yourself to know that your skin is healthy appearing! Additionally, at-home self examinations may be helpful for detecting a possible melanoma  Use the ABCDEs we discussed and check your moles once a month at home  CHERRY ANGIOMAS    Physical Exam:   Anatomic Location Affected:  Trunk and extremities    Morphological Description:  Scattered cherry red, 1-4 mm papules   Pertinent Positives:   Pertinent Negatives: Additional History of Present Condition:  Discovered upon skin exam     Assessment and Plan:  Based on a thorough discussion of this condition and the management approach to it (including a comprehensive discussion of the known risks, side effects and potential benefits of treatment), the patient (family) agrees to implement the following specific plan:   Reassured benign     Assessment and Plan:    Cherry angioma, also known as Andie Kaiser spots, are benign vascular skin lesions  A "cherry angioma" is a firm red, blue or purple papule, 0 1-1 cm in diameter  When thrombosed, they can appear black in colour until evaluated with a dermatoscope when the red or purple colour is more easily seen  Cherry angioma may develop on any part of the body but most often appear on the scalp, face, lips and trunk  An angioma is due to proliferating endothelial cells; these are the cells that line the inside of a blood vessel  Angiomas can arise in early life or later in life; the most common type of angioma is a cherry angioma  Cherry angiomas are very common in males and females of any age or race  They are more noticeable in white skin than in skin of colour  They markedly increase in number from about the age of 36  There may be a family history of similar lesions   Eruptive cherry angiomas have been rarely reported to be associated with internal malignancy  The cause of angiomas is unknown  Genetic analysis of cherry angiomas has shown that they frequently carry specific somatic missense mutations in the GNAQ and GNA11 (Q209H) genes, which are involved in other vascular and melanocytic proliferations  Cherry angioma is usually diagnosed clinically and no investigations are necessary for the majority of lesions  It has a characteristic red-clod or lobular pattern on dermatoscopy (called lacunar pattern using conventional pattern analysis)  When there is uncertainty about the diagnosis, a biopsy may be performed  The angioma is composed of venules in a thickened papillary dermis  Collagen bundles may be prominent between the lobules  Cherry angiomas are harmless, so they do not usually have to be treated  Occasionally, they are removed to exclude a malignant skin lesion such as a nodular melanoma or because they are irritated or bleeding (and a subsequent risk for infection)  To decrease friction over the lesions, we recommend Neutrogena Daily Defense SPF 50+ at least 3 times a day  SEBORRHEIC KERATOSIS; NON-INFLAMED    Physical Exam:   Anatomic Location Affected:  Trunk and extremities   Morphological Description:  Flat and raised, waxy, smooth to warty textured, yellow to brownish-grey to dark brown to blackish, discrete, "stuck-on" appearing papules   Pertinent Positives:   Pertinent Negatives: Additional History of Present Condition:  Patient reports new bumps on the skin  Denies itch, burn, pain, bleeding or ulceration  Present constantly; nothing seems to make it worse or better  No prior treatment        Assessment and Plan:  Based on a thorough discussion of this condition and the management approach to it (including a comprehensive discussion of the known risks, side effects and potential benefits of treatment), the patient (family) agrees to implement the following specific plan:   Reassured benign     Seborrheic Keratosis  A seborrheic keratosis is a harmless warty spot that appears during adult life as a common sign of skin aging  Seborrheic keratoses can arise on any area of skin, covered or uncovered, with the usual exception of the palms and soles  They do not arise from mucous membranes  Seborrheic keratoses can have highly variable appearance  Seborrheic keratoses are extremely common  It has been estimated that over 90% of adults over the age of 61 years have one or more of them  They occur in males and females of all races, typically beginning to erupt in the 35s or 45s  They are uncommon under the age of 21 years  The precise cause of seborrhoeic keratoses is not known  Seborrhoeic keratoses are considered degenerative in nature  As time goes by, seborrheic keratoses tend to become more numerous  Some people inherit a tendency to develop a very large number of them; some people may have hundreds of them  The name "seborrheic keratosis" is misleading, because these lesions are not limited to a seborrhoeic distribution (scalp, mid-face, chest, upper back), nor are they formed from sebaceous glands, nor are they associated with sebum -- which is greasy  Seborrheic keratosis may also be called "SK," "Seb K," "basal cell papilloma," "senile wart," or "barnacle "      Researchers have noted:   Eruptive seborrhoeic keratoses can follow sunburn or dermatitis   Skin friction may be the reason they appear in body folds   Viral cause (e g , human papillomavirus) seems unlikely   Stable and clonal mutations or activation of FRFR3, PIK3CA, CELESTE, AKT1 and EGFR genes are found in seborrhoeic keratoses   Seborrhoeic keratosis can arise from solar lentigo   FRFR3 mutations also arise in solar lentigines   These mutations are associated with increased age and location on the head and neck, suggesting a role of ultraviolet radiation in these lesions   Seborrheic keratoses do not harbour tumour suppressor gene mutations   Epidermal growth factor receptor inhibitors, which are used to treat some cancers, often result in an increase in verrucal (warty) keratoses  There is no easy way to remove multiple lesions on a single occasion  Unless a specific lesion is "inflamed" and is causing pain or stinging/burning or is bleeding, most insurance companies do not authorize treatment  NEOPLASM OF UNCERTAIN BEHAVIOR OF SKIN    Physical Exam:   (Anatomic Location); (Size and Morphological Description); (Differential Diagnosis):  o Right groin; 1 by 1 cm pedunculated papule  Diff Dx  Nevus versus skin tag    Pertinent Positives:   Pertinent Negatives: Additional History of Present Condition:  Patient reports irritation     Assessment and Plan:   I have discussed with the patient that a sample of skin via a "skin biopsy would be potentially helpful to further make a specific diagnosis under the microscope   Based on a thorough discussion of this condition and the management approach to it (including a comprehensive discussion of the known risks, side effects and potential benefits of treatment), the patient (family) agrees to implement the following specific plan:    o Procedure:  Skin Biopsy  After a thorough discussion of treatment options and risk/benefits/alternatives (including but not limited to local pain, scarring, dyspigmentation, blistering, possible superinfection, and inability to confirm a diagnosis via histopathology), verbal and written consent were obtained and portion of the rash was biopsied for tissue sample  See below for consent that was obtained from patient and subsequent Procedure Note  PROCEDURE TANGENTIAL (SHAVE) BIOPSY NOTE:     Performing Physician: Liya Carlos Anatomic Location; Clinical Description with size (cm); Pre-Op Diagnosis:      Right groin; 1 by 1 cm pedunculated papule Diff Dx   Nevus versus skin tag    Post-op diagnosis: Same      Local anesthesia: 2% Lidocaine  HCL  Topical anesthesia: None     Hemostasis: Aluminum chloride       After obtaining informed consent  at which time there was a discussion about the purpose of biopsy  and low risks of infection and bleeding  The area was prepped and draped in the usual fashion  Anesthesia was obtained with 1% lidocaine with epinephrine  A shave biopsy to an appropriate sampling depth was obtained by Shave (Dermablade or 15 blade) The resulting wound was covered with surgical ointment and bandaged appropriately  The patient tolerated the procedure well without complications and was without signs of functional compromise  Specimen has been sent for review by Dermatopathology  Standard post-procedure care has been explained and has been included in written form within the patient's copy of Informed Consent  INFORMED CONSENT DISCUSSION AND POST-OPERATIVE INSTRUCTIONS FOR PATIENT    I   RATIONALE FOR PROCEDURE  I understand that a skin biopsy allows the Dermatologist to test a lesion or rash under the microscope to obtain a diagnosis  It usually involves numbing the area with numbing medication and removing a small piece of skin; sometimes the area will be closed with sutures  In this specific procedure, sutures are not usually needed  If any sutures are placed, then they are usually need to be removed in 2 weeks or less  I understand that my Dermatologist recommends that a skin "shave" biopsy be performed today  A local anesthetic, similar to the kind that a dentist uses when filling a cavity, will be injected with a very small needle into the skin area to be sampled  The injected skin and tissue underneath "will go to sleep and become numb so no pain should be felt afterwards  An instrument shaped like a tiny "razor blade" (shave biopsy instrument) will be used to cut a small piece of tissue and skin from the area so that a sample of tissue can be taken and examined more closely under the microscope    A slight amount of bleeding will occur, but it will be stopped with direct pressure and a pressure bandage and any other appropriate methods  I understands that a scar will form where the wound was created  Surgical ointment will be applied to help protect the wound  Sutures are not usually needed  II   RISKS AND POTENTIAL COMPLICATIONS   I understand the risks and potential complications of a skin biopsy include but are not limited to the following:   Bleeding   Infection   Pain   Scar/keloid   Skin discoloration   Incomplete Removal   Recurrence   Nerve Damage/Numbness/Loss of Function   Allergic Reaction to Anesthesia   Biopsies are diagnostic procedures and based on findings additional treatment or evaluation may be required   Loss or destruction of specimen resulting in no additional findings    My Dermatologist has explained to me the nature of the condition, the nature of the procedure, and the benefits to be reasonably expected compared with alternative approaches  My Dermatologist has discussed the likelihood of major risks or complications of this procedure including the specific risks listed above, such as bleeding, infection, and scarring/keloid  I understand that a scar is expected after this procedure  I understand that my physician cannot predict if the scar will form a "keloid," which extends beyond the borders of the wound that is created  A keloid is a thick, painful, and bumpy scar  A keloid can be difficult to treat, as it does not always respond well to therapy, which includes injecting cortisone directly into the keloid every few weeks  While this usually reduces the pain and size of the scar, it does not eliminate it  I understand that photographs may be taken before and after the procedure  These will be maintained as part of the medical providers confidential records and may not be made available to me    I further authorize the medical provider to use the photographs for teaching purposes or to illustrate scientific papers, books, or lectures if in his/her judgment, medical research, education, or science may benefit from its use  I have had an opportunity to fully inquire about the risks and benefits of this procedure and its alternatives  I have been given ample time and opportunity to ask questions and to seek a second opinion if I wished to do so  I acknowledge that there have specifically been no guarantees as to the cosmetic results from the procedure  I am aware that with any procedure there is always the possibility of an unexpected complication  III  POST-PROCEDURAL CARE (WHAT YOU WILL NEED TO DO "AFTER THE BIOPSY" TO OPTIMIZE HEALING)     Keep the area clean and dry  Try NOT to remove the bandage or get it wet for the first 24 hours   Gently clean the area and apply surgical ointment (such as Vaseline petrolatum ointment, which is available "over the counter" and not a prescription) to the biopsy site for up to 2 weeks straight  This acts to protect the wound from the outside world   Sutures are not usually placed in this procedure  If any sutures were placed, return for suture removal as instructed (generally 1 week for the face, 2 weeks for the body)   Take Acetaminophen (Tylenol) for discomfort, if no contraindications  Ibuprofen or aspirin could make bleeding worse   Call our office immediately for signs of infection: fever, chills, increased redness, warmth, tenderness, discomfort/pain, or pus or foul smell coming from the wound  WHAT TO DO IF THERE IS ANY BLEEDING? If a small amount of bleeding is noticed, place a clean cloth over the area and apply firm pressure for ten minutes  Check the wound after 10 minutes of direct pressure  If bleeding persists, try one more time for an additional 10 minutes of direct pressure on the area    If the bleeding becomes heavier or does not stop after the second attempt, or if you have any other questions about this procedure, then please call your [address] [place] [address]'s Dermatologist by calling [phone] (SKIN)  I hereby acknowledge that I have reviewed and verified the site with my Dermatologist and have requested and authorized my Dermatologist to proceed with the procedure              Scribe Attestation    I,:  Jazmine Ramirez MA am acting as a scribe while in the presence of the attending physician :       I,:  Stefany Jones MD personally performed the services described in this documentation    as scribed in my presence :

## 2022-07-07 ENCOUNTER — TELEPHONE (OUTPATIENT)
Dept: DERMATOLOGY | Facility: CLINIC | Age: 57
End: 2022-07-07

## 2022-07-07 NOTE — TELEPHONE ENCOUNTER
Spoke with the patient and he is aware of his pathology results  Patient has no further questions or concerns at this time

## 2022-07-07 NOTE — TELEPHONE ENCOUNTER
----- Message from Nick Dave MD sent at 7/6/2022 10:37 PM EDT -----  Clinical derm team: please inform patient that biopsy shows a benign mole (nevus)  Thank you

## 2022-07-11 ENCOUNTER — CONSULT (OUTPATIENT)
Dept: NEUROLOGY | Facility: CLINIC | Age: 57
End: 2022-07-11
Payer: COMMERCIAL

## 2022-07-11 VITALS
HEART RATE: 75 BPM | SYSTOLIC BLOOD PRESSURE: 130 MMHG | HEIGHT: 63 IN | WEIGHT: 196.2 LBS | DIASTOLIC BLOOD PRESSURE: 86 MMHG | BODY MASS INDEX: 34.76 KG/M2

## 2022-07-11 DIAGNOSIS — H53.9 VISION CHANGES: ICD-10-CM

## 2022-07-11 PROCEDURE — 99205 OFFICE O/P NEW HI 60 MIN: CPT | Performed by: STUDENT IN AN ORGANIZED HEALTH CARE EDUCATION/TRAINING PROGRAM

## 2022-07-11 NOTE — PROGRESS NOTES
[address] Neurology Consult  PATIENT:  Betty Ruiz  MRN:  [id number]  :  1965  DATE OF SERVICE:  2022  REFERRED BY: Miguel Urbina DO  PMD: Marilee Marin DO    Assessment/Plan:     Betty Ruiz is a very pleasant 55 y o  male with no significant PMHx who presents for evaluation of vision changes  Vision disturbance  -unclear etiology at this time  Agree with plan from his family physician to obtain MRI brain to rule out space occupying lesions that may be leading to his symptoms especially given intermittent headaches  Neurologic exam benign today  If imaging negative, would consider w/u for MG given fatiguing nature to his symptoms  -CMP, A1c, TSH, vit A, vit B12, vit D  -will f/u results of MRI  -continue regular follow up with ophthalmology    F/u in 4-5 months    CC:   Vision changes    History of Present Illness:     55 y o  male with no significant PMHx who presents for evaluation of vision changes  He states that these symptoms may have started around 2021, but became more prominent In 2022  He states that 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 straining his eyes all day long looking at small schematics  He has been seeing his optometrist and ophthalmologist regularly since then and has had the prescription changed in his glasses five times without improvement  He sees an opthamologist in [place]  He states that these symptoms seem to worsen throughout the day  He has no issues during his day shift, but while he is on night shift around 2-3 AM he starts to have symptoms  He feels that it affects his R eye more than his L  Denies double vision, states that he would describe it more as blurry vision  He was placed on prism lenses at one point with little change  He had one episode that the drove into the wrong [place] of traffic because he could not see properly  Follows with [facility]      Denies eye pain, but does state that as the day goes on he will feel additional eye pressure  Does endorse intermittent frontal headaches that do not have a clear temporal relationship to his symptoms  Past Medical History:     Past Medical History:   Diagnosis Date   • Allergic 1965    PCN red dots as a baby   • Chondromalacia of left patellofemoral joint    • Colon polyp        Patient Active Problem List   Diagnosis   • BMI 33 0-33 9,adult   • Family history of abdominal aortic aneurysm       Medications:      Current Outpatient Medications   Medication Sig Dispense Refill   • fluticasone (FLONASE) 50 mcg/act nasal spray 2 sprays into each nostril daily 16 g 0   • multivitamin (THERAGRAN) TABS Take 1 tablet by mouth daily       No current facility-administered medications for this visit  Allergies: Allergies   Allergen Reactions   • Penicillins      Patient was told by parents        Family History:     Family History   Problem Relation Age of Onset   • Heart defect Mother    • Stroke Mother         Mom  from stroke in  at age of 80   • Stroke Father         Lost right field of vision at [de-identified]years old and lived another 7 years fine   • Coronary artery disease Father    • Prostate cancer Father    • Diabetes Maternal Grandmother    • Diabetes Maternal Aunt    • Diabetes Paternal Uncle        Social History:       Social History     Socioeconomic History   • Marital status: /Civil Union     Spouse name: Not on file   • Number of children: Not on file   • Years of education: Not on file   • Highest education level: Not on file   Occupational History   • Not on file   Tobacco Use   • Smoking status: Never Smoker   • Smokeless tobacco: Never Used   Vaping Use   • Vaping Use: Never used   Substance and Sexual Activity   • Alcohol use:  Yes     Alcohol/week: 1 0 standard drink     Types: 1 Glasses of wine per week   • Drug use: Never   • Sexual activity: Yes     Partners: Female     Birth control/protection: None   Other Topics Concern   • Not on file   Social History Narrative   • Not on file     Social Determinants of Health     Financial Resource Strain: Not on file   Food Insecurity: Not on file   Transportation Needs: Not on file   Physical Activity: Sufficiently Active   • Days of Exercise per Week: 3 days   • Minutes of Exercise per Session: 90 min   Stress: No Stress Concern Present   • Feeling of Stress : Not at all   Social Connections: Not on file   Intimate Partner Violence: Not At Risk   • Fear of Current or Ex-Partner: No   • Emotionally Abused: No   • Physically Abused: No   • Sexually Abused: No   Housing Stability: Not on file         Objective:   /86 (BP Location: Right arm, Patient Position: Sitting, Cuff Size: Standard)   Pulse 75   Ht 5' 3 25" (1 607 m)   Wt 89 kg (196 lb 3 2 oz)   BMI 34 48 kg/m²     General: Patient is not in any acute/apparent distress, well nourished, well developed and cooperative  HEENT: normocephalic, atraumatic, moist membranes  Neck: supple  Extremities: no edema noted   Skin: no lesions or rash  Musculosketal: no bony abnormalities    Neurologic Examination:   Mental status: alert, awake, oriented X 3 and following commands  Speech/Language: Speech is fluent without any dysarthria, no aphasia noted, can name, comprehension intact    Cranial Nerves:   CN I: smell not tested  CN II: Visual fields full to confrontation, fundus - no papilledema noted  CN III, IV, VI: Extraocular movements intact bilaterally  Pupils equal round and reactive to light bilaterally  CN V: Facial sensation is normal   CN VII: Full and symmetric facial movement  CN VIII: Hearing is normal   CN IX, X: Palate elevates symmetrically  CN XI: Shoulder shrug strength is normal   CN XII: Tongue midline without atrophy or fasciculations  Motor:   Strength 5/5 in all 4 extremities  No pronator drift  Normal rapid alternating movements   Bulk/tone - normal  Fasiculations - none    Sensory:   Sensation intact to soft touch in all 4 extremities  Cerebellar:   Finger-to-nose intact, normal heel to shin  Reflexes: 2+ in all 4 extremities  Pathologic reflexes - babinski reflex negative    Gait:   Normal gait, Romberg sign negative     Review of Systems:     Review of Systems   Constitutional: Negative  Negative for appetite change and fever  HENT: Negative  Negative for hearing loss, tinnitus, trouble swallowing and voice change  Eyes: Negative  Negative for photophobia and pain  Respiratory: Negative  Negative for shortness of breath  Cardiovascular: Negative  Negative for palpitations  Gastrointestinal: Negative  Negative for nausea and vomiting  Endocrine: Negative  Negative for cold intolerance  Genitourinary: Negative  Negative for dysuria, frequency and urgency  Musculoskeletal: Negative  Negative for myalgias and neck pain  Skin: Negative  Negative for rash  Neurological: Negative  Negative for dizziness, tremors, seizures, syncope, facial asymmetry, speech difficulty, weakness, light-headedness, numbness and headaches  Hematological: Negative  Does not bruise/bleed easily  Psychiatric/Behavioral: Negative  Negative for confusion, hallucinations and sleep disturbance  I have spent 55 minutes with Patient today in which greater than 50% of this time was spent in counseling/coordination of care regarding Risks and benefits of tx options, Intructions for management, Patient and family education, Risk factor reductions and Impressions  I also spent 20 minutes non face to face for this patient the same day

## 2022-07-11 NOTE — PATIENT INSTRUCTIONS
Patient Instructions: It was a pleasure meeting you today! I will follow up the results of your MRI that you are having performed on 7/13/22   In addition, I would like to send a few other lab tests to evaluate your thyroid, blood sugar

## 2022-07-13 ENCOUNTER — HOSPITAL ENCOUNTER (OUTPATIENT)
Dept: MRI IMAGING | Facility: HOSPITAL | Age: 57
Discharge: HOME | End: 2022-07-13
Payer: COMMERCIAL

## 2022-07-13 DIAGNOSIS — H53.2 DOUBLE VISION WITH BOTH EYES OPEN: ICD-10-CM

## 2022-07-13 DIAGNOSIS — H57.9 EYE PRESSURE: ICD-10-CM

## 2022-07-13 DIAGNOSIS — R51.9 NEW ONSET HEADACHE: ICD-10-CM

## 2022-07-13 DIAGNOSIS — H53.9 ABNORMAL VISION OF BOTH EYES: ICD-10-CM

## 2022-07-13 DIAGNOSIS — H53.9 CHANGE IN VISION: ICD-10-CM

## 2022-07-13 PROCEDURE — A9585 GADOBUTROL INJECTION: HCPCS | Performed by: FAMILY MEDICINE

## 2022-07-13 PROCEDURE — 70553 MRI BRAIN STEM W/O & W/DYE: CPT

## 2022-07-13 PROCEDURE — G1004 CDSM NDSC: HCPCS

## 2022-07-13 RX ADMIN — GADOBUTROL 8 ML: 604.72 INJECTION INTRAVENOUS at 13:32

## 2022-09-29 ENCOUNTER — TELEMEDICINE (OUTPATIENT)
Dept: FAMILY MEDICINE CLINIC | Facility: CLINIC | Age: 57
End: 2022-09-29
Payer: COMMERCIAL

## 2022-09-29 ENCOUNTER — TELEPHONE (OUTPATIENT)
Dept: FAMILY MEDICINE CLINIC | Facility: CLINIC | Age: 57
End: 2022-09-29

## 2022-09-29 DIAGNOSIS — U07.1 COVID-19: Primary | ICD-10-CM

## 2022-09-29 PROCEDURE — 99442 PR PHYS/QHP TELEPHONE EVALUATION 11-20 MIN: CPT | Performed by: FAMILY MEDICINE

## 2022-09-29 PROCEDURE — 99214 OFFICE O/P EST MOD 30 MIN: CPT | Performed by: FAMILY MEDICINE

## 2022-09-29 RX ORDER — NIRMATRELVIR AND RITONAVIR 300-100 MG
3 KIT ORAL 2 TIMES DAILY
Qty: 30 TABLET | Refills: 0 | Status: SHIPPED | OUTPATIENT
Start: 2022-09-29 | End: 2022-10-04

## 2022-09-29 NOTE — TELEPHONE ENCOUNTER
Patient started with symptoms yesterday and tested positive today for covid  His wife told him to call and see if he can get the medication for covid  Do you want me to squeeze him in for a virtual at some point today?

## 2022-09-29 NOTE — PROGRESS NOTES
COVID-19 Outpatient Progress Note    Assessment/Plan:    Problem List Items Addressed This Visit    None     Visit Diagnoses     COVID-19    -  Primary    Relevant Medications    nirmatrelvir & ritonavir (Paxlovid, 300/100,) tablet therapy pack         Disposition:     Patient has asymptomatic or mild COVID-19 infection  Based off CDC guidelines, they were recommended to isolate for 5 days  If they are asymptomatic or symptoms are improving with no fevers in the past 24 hours, isolation may be ended followed by 5 days of wearing a mask when around othes to minimize risk of infecting others  If still have a fever or other symptoms have not improved, continue to isolate until they improve  Regardless of when they end isolation, avoid being around people who are more likely to get very sick from COVID-19 until at least day 11  Discussed symptom directed medication options with patient  Patient meets criteria for PAXLOVID and they have been counseled appropriately according to EUA documentation released by the FDA  After discussion, patient agrees to treatment  [address] is an investigational medicine used to treat mild-to-moderate COVID-19 in adults and children (15years of age and older weighing at least 80 pounds (40 kg)) with positive results of direct SARS-CoV-2 viral testing, and who are at high risk for progression to severe COVID-19, including hospitalization or death  PAXLOVID is investigational because it is still being studied  There is limited information about the safety and effectiveness of using PAXLOVID to treat people with mild-to-moderate COVID-19      The FDA has authorized the emergency use of PAXLOVID for the treatment of mild-tomoderate COVID-19 in adults and children (15years of age and older weighing at least 80 pounds (40 kg)) with a positive test for the virus that causes COVID-19, and who are at high risk for progression to severe COVID-19, including hospitalization or death, under an EUA  What should I tell my healthcare provider before I take PAXLOVID? Tell your healthcare provider if you:  - Have any allergies  - Have liver or kidney disease  - Are pregnant or plan to become pregnant  - Are breastfeeding a child  - Have any serious illnesses    Tell your healthcare provider about all the medicines you take, including prescription and over-the-counter medicines, vitamins, and herbal supplements  Some medicines may interact with PAXLOVID and may cause serious side effects  Keep a list of your medicines to show your healthcare provider and pharmacist when you get a new medicine  You can ask your healthcare provider or pharmacist for a list of medicines that interact with PAXLOVID  Do not start taking a new medicine without telling your healthcare provider  Your healthcare provider can tell you if it is safe to take PAXLOVID with other medicines  Tell your healthcare provider if you are taking combined hormonal contraceptive  PAXLOVID may affect how your birth control pills work  Females who are able to become pregnant should use another effective alternative form of contraception or an additional barrier method of contraception  Talk to your healthcare provider if you have any questions about contraceptive methods that might be right for you  How do I take PAXLOVID? PAXLOVID consists of 2 medicines: nirmatrelvir and ritonavir  - Take 2 pink tablets of nirmatrelvir with 1 white tablet of ritonavir by mouth 2 times each day (in the morning and in the evening) for 5 days  For each dose, take all 3 tablets at the same time  - If you have kidney disease, talk to your healthcare provider  You may need a different dose  - Swallow the tablets whole  Do not chew, break, or crush the tablets  - Take PAXLOVID with or without food  - Do not stop taking PAXLOVID without talking to your healthcare provider, even if you feel better    - If you miss a dose of PAXLOVID within 8 hours of the time it is usually taken, take it as soon as you remember  If you miss a dose by more than 8 hours, skip the missed dose and take the next dose at your regular time  Do not take 2 doses of PAXLOVID at the same time  - If you take too much PAXLOVID, call your healthcare provider or go to the nearest hospital emergency room right away  - If you are taking a ritonavir- or cobicistat-containing medicine to treat hepatitis C or Human Immunodeficiency Virus (HIV), you should continue to take your medicine as prescribed by your healthcare provider   - Talk to your healthcare provider if you do not feel better or if you feel worse after 5 days  Who should generally not take PAXLOVID? Do not take PAXLOVID if:  You are allergic to nirmatrelvir, ritonavir, or any of the ingredients in PAXLOVID  You are taking any of the following medicines:  - Alfuzosin  - Pethidine, piroxicam, propoxyphene  - Ranolazine  - Amiodarone, dronedarone, flecainide, propafenone, quinidine  - Colchicine  - Lurasidone, pimozide, clozapine  - Dihydroergotamine, ergotamine, methylergonovine  - Lovastatin, simvastatin  - Sildenafil (Revatio®) for pulmonary arterial hypertension (PAH)  - Triazolam, oral midazolam  - Apalutamide  - Carbamazepine, phenobarbital, phenytoin  - Rifampin  - St  Josephs Wort (hypericum perforatum)    What are the important possible side effects of PAXLOVID? Possible side effects of PAXLOVID are:  - Liver Problems  Tell your healthcare provider right away if you have any of these signs and symptoms of liver problems: loss of appetite, yellowing of your skin and the whites of eyes (jaundice), dark-colored urine, pale colored stools and itchy skin, stomach area (abdominal) pain  - Resistance to HIV Medicines  If you have untreated HIV infection, PAXLOVID may lead to some HIV medicines not working as well in the future    - Other possible side effects include: altered sense of taste, diarrhea, high blood pressure, or muscle aches    These are not all the possible side effects of PAXLOVID  Not many people have taken PAXLOVID  Serious and unexpected side effects may happen  Eddie Garces is still being studied, so it is possible that all of the risks are not known at this time  What other treatment choices are there? Like Toyei Manus may allow for the emergency use of other medicines to treat people with COVID-19  Go to https://[employer]/ for information on the emergency use of other medicines that are authorized by FDA to treat people with COVID-19  Your healthcare provider may talk with you about clinical trials for which you may be eligible  It is your choice to be treated or not to be treated with PAXLOVID  Should you decide not to receive it or for your child not to receive it, it will not change your standard medical care  What if I am pregnant or breastfeeding? There is no experience treating pregnant women or breastfeeding mothers with PAXLOVID  For a mother and unborn baby, the benefit of taking PAXLOVID may be greater than the risk from the treatment  If you are pregnant, discuss your options and specific situation with your healthcare provider  It is recommended that you use effective barrier contraception or do not have sexual activity while taking PAXLOVID  If you are breastfeeding, discuss your options and specific situation with your healthcare provider  How do I report side effects with PAXLOVID? Contact your healthcare provider if you have any side effects that bother you or do not go away  Report side effects to FDA MedWatch at www fda gov/medwatch or call 2-760-SNX5018 or you can report side effects to [facility]  at the contact information provided below  Website Fax number Telephone number   [employer] [phone] [phone]     How should I store PAXLOVID? Store PAXLOVID tablets at room temperature between 68°F to 77°F (20°C to 25°C)  Full fact sheet for patients, parents, and caregivers can be found at: [employer]    I have spent 20 minutes directly with the patient  Greater than 50% of this time was spent in counseling/coordination of care regarding: diagnostic results, prognosis, risks and benefits of treatment options, instructions for management, patient and family education, importance of treatment compliance, risk factor reductions and impressions  Encounter provider: Kiet lOson DO     Provider located at: [address] [place] [id number]  [phone]     Recent Visits  No visits were found meeting these conditions  Showing recent visits within past 7 days and meeting all other requirements  Today's Visits  Date Type Provider Dept   09/29/22 Telemedicine DO Billy Montalvo   09/29/22 Telephone DO Billy Montalvo   Showing today's visits and meeting all other requirements  Future Appointments  No visits were found meeting these conditions  Showing future appointments within next 150 days and meeting all other requirements     This virtual check-in was done via [facility] and patient was informed that this is a secure, HIPAA-compliant platform  He agrees to proceed  Patient agrees to participate in a virtual check in via telephone or video visit instead of presenting to the office to address urgent/immediate medical needs  Patient is aware this is a billable service  He acknowledged consent and understanding of privacy and security of the video platform  The patient has agreed to participate and understands they can discontinue the visit at any time  After connecting through [facility], the patient was identified by name and date of birth   Kenyetta Lee was informed that this was a telemedicine visit and that the exam was being conducted confidentially over secure lines  My office door was closed  No one else was in the room  Michelle Jackson acknowledged consent and understanding of privacy and security of the telemedicine visit  I informed the patient that I have reviewed his record in Epic and presented the opportunity for him to ask any questions regarding the visit today  The patient agreed to participate  Verification of patient location:  Patient is located in the following state in which I hold an active license: [place]    Subjective:   Michelle Jackson is a 55 y o  male who has been screened for COVID-19  Symptom change since last report: unchanged  Patient's symptoms include chills, nasal congestion, sore throat, cough and headache  Patient denies fever, rhinorrhea, shortness of breath, abdominal pain, nausea, vomiting, diarrhea and myalgias  - Date of symptom onset: 9/27/2022  - Date of positive COVID-19 test: 9/29/2022  Type of test: Home antigen  COVID-19 vaccination status: Fully vaccinated (primary series)    Jet Lemus has been staying home and has isolated themselves in his home  He is taking care to not share personal items and is cleaning all surfaces that are touched often, like counters, tabletops, and doorknobs using household cleaning sprays or wipes  He is wearing a mask when he leaves his room  Lab Results   Component Value Date    SARSCOV2 Negative 01/02/2022    SARSCOV2 Not Detected 09/26/2020       Review of Systems   Constitutional: Positive for chills  Negative for fever  HENT: Positive for congestion and sore throat  Negative for postnasal drip, rhinorrhea and sinus pain  Eyes: Negative for photophobia and visual disturbance  Respiratory: Positive for cough  Negative for shortness of breath  Cardiovascular: Negative for chest pain, palpitations and leg swelling     Gastrointestinal: Negative for abdominal pain, constipation, diarrhea, nausea and vomiting  Genitourinary: Negative for difficulty urinating and dysuria  Musculoskeletal: Negative for arthralgias and myalgias  Skin: Negative for rash  Neurological: Positive for headaches  Negative for dizziness and syncope  Current Outpatient Medications on File Prior to Visit   Medication Sig    fluticasone (FLONASE) 50 mcg/act nasal spray 2 sprays into each nostril daily    multivitamin (THERAGRAN) TABS Take 1 tablet by mouth daily       Objective: There were no vitals taken for this visit  It was my intent to perform this visit via video technology but the patient was not able to do a video connection so the visit was completed via audio telephone only      Physical Exam  Denisha Ribeiro DO

## 2022-10-28 ENCOUNTER — OFFICE VISIT (OUTPATIENT)
Dept: FAMILY MEDICINE CLINIC | Facility: CLINIC | Age: 57
End: 2022-10-28
Payer: COMMERCIAL

## 2022-10-28 VITALS
SYSTOLIC BLOOD PRESSURE: 122 MMHG | DIASTOLIC BLOOD PRESSURE: 80 MMHG | HEIGHT: 63 IN | HEART RATE: 84 BPM | WEIGHT: 205.2 LBS | TEMPERATURE: 98.7 F | OXYGEN SATURATION: 96 % | RESPIRATION RATE: 18 BRPM | BODY MASS INDEX: 36.36 KG/M2

## 2022-10-28 DIAGNOSIS — Z12.5 SCREENING FOR PROSTATE CANCER: ICD-10-CM

## 2022-10-28 DIAGNOSIS — Z13.6 SCREENING FOR CARDIOVASCULAR CONDITION: ICD-10-CM

## 2022-10-28 DIAGNOSIS — U09.9 COVID-19 LONG HAULER MANIFESTING CHRONIC COUGH: Primary | ICD-10-CM

## 2022-10-28 DIAGNOSIS — Z23 NEED FOR VACCINATION: Primary | ICD-10-CM

## 2022-10-28 DIAGNOSIS — R05.3 COVID-19 LONG HAULER MANIFESTING CHRONIC COUGH: Primary | ICD-10-CM

## 2022-10-28 PROCEDURE — 90682 RIV4 VACC RECOMBINANT DNA IM: CPT

## 2022-10-28 PROCEDURE — 99214 OFFICE O/P EST MOD 30 MIN: CPT | Performed by: FAMILY MEDICINE

## 2022-10-28 PROCEDURE — 90471 IMMUNIZATION ADMIN: CPT

## 2022-10-28 RX ORDER — FLUTICASONE PROPIONATE 110 UG/1
2 AEROSOL, METERED RESPIRATORY (INHALATION) 2 TIMES DAILY
Qty: 12 G | Refills: 1 | Status: SHIPPED | OUTPATIENT
Start: 2022-10-28

## 2022-10-28 RX ORDER — PREDNISONE 20 MG/1
40 TABLET ORAL DAILY
Qty: 10 TABLET | Refills: 0 | Status: SHIPPED | OUTPATIENT
Start: 2022-10-28 | End: 2022-11-02

## 2022-10-28 RX ORDER — ALBUTEROL SULFATE 90 UG/1
2 AEROSOL, METERED RESPIRATORY (INHALATION) EVERY 6 HOURS PRN
Qty: 6.7 G | Refills: 5 | Status: SHIPPED | OUTPATIENT
Start: 2022-10-28

## 2022-10-28 NOTE — PROGRESS NOTES
Harpreet Swanson 1965 male MRN: [id number]    Family Medicine Acute Visit    ASSESSMENT/PLAN  Problem List Items Addressed This Visit        Other    COVID-19 long hauler manifesting chronic cough - Primary    Relevant Medications    predniSONE 20 mg tablet    fluticasone (Flovent HFA) 110 MCG/ACT inhaler    albuterol (Proventil HFA) 90 mcg/act inhaler      Other Visit Diagnoses     Screening for prostate cancer        Relevant Orders    PSA, Total Screen    Screening for cardiovascular condition        Relevant Orders    Lipid panel          Follow up for CP as scheduled in January       Future Appointments   Date Time Provider Shonda Yoo   1/10/2023  7:30 AM Tony Romberg, DO [facility]          SUBJECTIVE  CC: Breathing Problem (Covid last month, slight wheezing present per wife  Pt Denies SOB  Chest pain only when coughing)      HPI:  Harpreet Swanson is a 55 y o  male who presents for cough  covid positive about a month ago  Took paxlovid, recovered well  Reports he is still having some lingering cough, his wife hears him wheezing  He is here to get evaluated further  Took mucinex when he was sick with covid but nothing since  Review of Systems   Constitutional: Negative for chills and fever  HENT: Negative for congestion, postnasal drip, rhinorrhea and sinus pain  Eyes: Negative for photophobia and visual disturbance  Respiratory: Positive for cough and wheezing  Negative for shortness of breath  Cardiovascular: Negative for chest pain, palpitations and leg swelling  Gastrointestinal: Negative for abdominal pain, constipation, diarrhea, nausea and vomiting  Genitourinary: Negative for difficulty urinating and dysuria  Musculoskeletal: Negative for arthralgias and myalgias  Skin: Negative for rash  Neurological: Negative for dizziness and syncope         Historical Information   The patient history was reviewed as follows:  Past Medical History: Diagnosis Date   • Allergic 1965    PCN red dots as a baby   • Chondromalacia of left patellofemoral joint    • Colon polyp          Past Surgical History:   Procedure Laterality Date   • COLONOSCOPY  2018    Adenoma   • TONSILLECTOMY       Family History   Problem Relation Age of Onset   • Heart defect Mother    • Stroke Mother         Mom  from stroke in  at age of 80   • Stroke Father         Lost right field of vision at [de-identified]years old and lived another 7 years fine   • Coronary artery disease Father    • Prostate cancer Father    • Diabetes Maternal Grandmother    • Diabetes Maternal Aunt    • Diabetes Paternal Uncle       Social History   Social History     Substance and Sexual Activity   Alcohol Use Yes   • Alcohol/week: 1 0 standard drink   • Types: 1 Glasses of wine per week    Comment: wine once a month     Social History     Substance and Sexual Activity   Drug Use Never     Social History     Tobacco Use   Smoking Status Never Smoker   Smokeless Tobacco Never Used       Medications:     Current Outpatient Medications:   •  albuterol (Proventil HFA) 90 mcg/act inhaler, Inhale 2 puffs every 6 (six) hours as needed for wheezing, Disp: 6 7 g, Rfl: 5  •  fluticasone (Flovent HFA) 110 MCG/ACT inhaler, Inhale 2 puffs 2 (two) times a day Rinse mouth after use , Disp: 12 g, Rfl: 1  •  multivitamin (THERAGRAN) TABS, Take 1 tablet by mouth daily, Disp: , Rfl:   •  predniSONE 20 mg tablet, Take 2 tablets (40 mg total) by mouth daily for 5 days, Disp: 10 tablet, Rfl: 0  •  fluticasone (FLONASE) 50 mcg/act nasal spray, 2 sprays into each nostril daily (Patient not taking: Reported on 10/28/2022), Disp: 16 g, Rfl: 0    Allergies   Allergen Reactions   • Penicillins      Patient was told by parents        OBJECTIVE  Vitals:   Vitals:    10/28/22 1104 10/28/22 1122   BP: 122/80    BP Location: Right arm    Patient Position: Sitting    Cuff Size: Large    Pulse: 84    Resp: 18    Temp: 98 7 °F (37 1 °C) TempSrc: Tympanic    SpO2: 94% 96%   Weight: 93 1 kg (205 lb 3 2 oz)    Height: 5' 3 25" (1 607 m)          Physical Exam  Constitutional:       Appearance: He is well-developed  HENT:      Head: Normocephalic and atraumatic  Right Ear: External ear normal       Left Ear: External ear normal    Eyes:      Conjunctiva/sclera: Conjunctivae normal    Cardiovascular:      Rate and Rhythm: Normal rate and regular rhythm  Heart sounds: Normal heart sounds  No murmur heard  Pulmonary:      Effort: Pulmonary effort is normal  No respiratory distress  Breath sounds: Normal breath sounds  No wheezing  Comments: Course cough  Musculoskeletal:         General: Normal range of motion  Cervical back: Normal range of motion and neck supple  Skin:     General: Skin is warm and dry  Neurological:      Mental Status: He is alert and oriented to person, place, and time     Psychiatric:         Mood and Affect: Mood normal          Behavior: Behavior normal                     Ton Harley    10/28/2022

## 2022-11-15 DIAGNOSIS — R05.3 COVID-19 LONG HAULER MANIFESTING CHRONIC COUGH: ICD-10-CM

## 2022-11-15 DIAGNOSIS — U09.9 COVID-19 LONG HAULER MANIFESTING CHRONIC COUGH: ICD-10-CM

## 2022-11-15 RX ORDER — FLUTICASONE PROPIONATE 110 UG/1
2 AEROSOL, METERED RESPIRATORY (INHALATION) 2 TIMES DAILY
Qty: 12 G | Refills: 0 | Status: SHIPPED | OUTPATIENT
Start: 2022-11-15

## 2023-01-05 DIAGNOSIS — R05.3 COVID-19 LONG HAULER MANIFESTING CHRONIC COUGH: ICD-10-CM

## 2023-01-05 DIAGNOSIS — U09.9 COVID-19 LONG HAULER MANIFESTING CHRONIC COUGH: ICD-10-CM

## 2023-01-05 RX ORDER — DEXAMETHASONE 4 MG/1
TABLET ORAL
Qty: 12 G | Refills: 0 | Status: SHIPPED | OUTPATIENT
Start: 2023-01-05

## 2023-01-18 ENCOUNTER — APPOINTMENT (OUTPATIENT)
Dept: LAB | Facility: CLINIC | Age: 58
End: 2023-01-18

## 2023-01-18 DIAGNOSIS — Z13.6 SCREENING FOR CARDIOVASCULAR CONDITION: ICD-10-CM

## 2023-01-18 DIAGNOSIS — R73.01 IFG (IMPAIRED FASTING GLUCOSE): ICD-10-CM

## 2023-01-18 DIAGNOSIS — R79.89 ABNORMAL TSH: ICD-10-CM

## 2023-01-18 DIAGNOSIS — H53.9 VISION CHANGES: ICD-10-CM

## 2023-01-18 DIAGNOSIS — Z12.5 SCREENING FOR PROSTATE CANCER: ICD-10-CM

## 2023-01-18 LAB
25(OH)D3 SERPL-MCNC: 21.8 NG/ML (ref 30–100)
ALBUMIN SERPL BCP-MCNC: 4.2 G/DL (ref 3.5–5)
ALP SERPL-CCNC: 98 U/L (ref 46–116)
ALT SERPL W P-5'-P-CCNC: 44 U/L (ref 12–78)
ANION GAP SERPL CALCULATED.3IONS-SCNC: 6 MMOL/L (ref 4–13)
AST SERPL W P-5'-P-CCNC: 17 U/L (ref 5–45)
BILIRUB SERPL-MCNC: 0.8 MG/DL (ref 0.2–1)
BUN SERPL-MCNC: 14 MG/DL (ref 5–25)
CALCIUM SERPL-MCNC: 9.4 MG/DL (ref 8.3–10.1)
CHLORIDE SERPL-SCNC: 109 MMOL/L (ref 96–108)
CHOLEST SERPL-MCNC: 158 MG/DL
CO2 SERPL-SCNC: 25 MMOL/L (ref 21–32)
CREAT SERPL-MCNC: 0.94 MG/DL (ref 0.6–1.3)
EST. AVERAGE GLUCOSE BLD GHB EST-MCNC: 108 MG/DL
FOLATE SERPL-MCNC: 17.2 NG/ML (ref 3.1–17.5)
GFR SERPL CREATININE-BSD FRML MDRD: 90 ML/MIN/1.73SQ M
GLUCOSE P FAST SERPL-MCNC: 99 MG/DL (ref 65–99)
HBA1C MFR BLD: 5.4 %
HDLC SERPL-MCNC: 34 MG/DL
LDLC SERPL CALC-MCNC: 108 MG/DL (ref 0–100)
NONHDLC SERPL-MCNC: 124 MG/DL
POTASSIUM SERPL-SCNC: 4.3 MMOL/L (ref 3.5–5.3)
PROT SERPL-MCNC: 7.8 G/DL (ref 6.4–8.4)
PSA SERPL-MCNC: 2.5 NG/ML (ref 0–4)
SODIUM SERPL-SCNC: 140 MMOL/L (ref 135–147)
TRIGL SERPL-MCNC: 82 MG/DL
TSH SERPL DL<=0.05 MIU/L-ACNC: 3.55 UIU/ML (ref 0.45–4.5)
VIT B12 SERPL-MCNC: 522 PG/ML (ref 100–900)

## 2023-01-19 LAB
THYROGLOB AB SERPL-ACNC: <1 IU/ML (ref 0–0.9)
THYROPEROXIDASE AB SERPL-ACNC: <9 IU/ML (ref 0–34)

## 2023-01-24 LAB — VIT A SERPL-MCNC: 46.3 UG/DL (ref 20.1–62)

## 2023-02-10 ENCOUNTER — OFFICE VISIT (OUTPATIENT)
Dept: FAMILY MEDICINE CLINIC | Facility: CLINIC | Age: 58
End: 2023-02-10

## 2023-02-10 VITALS
HEART RATE: 85 BPM | TEMPERATURE: 98.7 F | RESPIRATION RATE: 17 BRPM | HEIGHT: 63 IN | BODY MASS INDEX: 33.38 KG/M2 | DIASTOLIC BLOOD PRESSURE: 76 MMHG | SYSTOLIC BLOOD PRESSURE: 122 MMHG | OXYGEN SATURATION: 95 % | WEIGHT: 188.4 LBS

## 2023-02-10 DIAGNOSIS — J06.9 VIRAL URI WITH COUGH: Primary | ICD-10-CM

## 2023-02-10 NOTE — PROGRESS NOTES
Name: Karen Santos      : 1965      MRN: [id number]  Encounter Provider: Celestino Spencer PA-C  Encounter Date: 2/10/2023   Encounter department: [facility]    Assessment & Plan     1  Viral URI with cough    Supportive care /appears viral in nature  Flonase  Sudafed  Call with any questions or concerns  Subjective      HPI   55-year-old male here today for sick appointment secondary to cough and nasal congestions for past several days  Denies any fevers or chills  Denies any dyspnea, shortness of breath, abdominal pain, GI symptoms, headaches, dizziness, weakness  All other systems negative  Postnasal drip and nasal congestion    Review of Systems  As per HPI  Current Outpatient Medications on File Prior to Visit   Medication Sig   • multivitamin (THERAGRAN) TABS Take 1 tablet by mouth daily   • albuterol (Proventil HFA) 90 mcg/act inhaler Inhale 2 puffs every 6 (six) hours as needed for wheezing   • Flovent  MCG/ACT inhaler INHALE 2 PUFFS BY MOUTH 2 TIMES A DAY RINSE MOUTH AFTER USE  • fluticasone (FLONASE) 50 mcg/act nasal spray 2 sprays into each nostril daily (Patient not taking: Reported on 10/28/2022)       Objective     /76 (BP Location: Left arm, Patient Position: Sitting, Cuff Size: Large)   Pulse 85   Temp 98 7 °F (37 1 °C) (Tympanic)   Resp 17   Ht 5' 3 25" (1 607 m)   Wt 85 5 kg (188 lb 6 4 oz)   SpO2 95%   BMI 33 11 kg/m²     Physical Exam  Vitals and nursing note reviewed  Constitutional:       General: He is not in acute distress  Appearance: He is not ill-appearing, toxic-appearing or diaphoretic  HENT:      Head: Normocephalic  Right Ear: Tympanic membrane normal       Left Ear: Tympanic membrane normal       Nose: Congestion and rhinorrhea present  Comments: Postnasal drip     Mouth/Throat:      Mouth: Mucous membranes are moist       Pharynx: No oropharyngeal exudate or posterior oropharyngeal erythema  Eyes:      General: No scleral icterus  Conjunctiva/sclera: Conjunctivae normal    Cardiovascular:      Rate and Rhythm: Normal rate and regular rhythm  Heart sounds: Normal heart sounds  No murmur heard  Pulmonary:      Effort: Pulmonary effort is normal  No respiratory distress  Breath sounds: Normal breath sounds  No stridor  No wheezing, rhonchi or rales  Chest:      Chest wall: No tenderness  Abdominal:      General: Bowel sounds are normal       Palpations: Abdomen is soft  Tenderness: There is no abdominal tenderness  Musculoskeletal:      Cervical back: Neck supple  Right lower leg: No edema  Left lower leg: No edema  Lymphadenopathy:      Cervical: No cervical adenopathy  Skin:     General: Skin is warm and dry  Neurological:      General: No focal deficit present  Mental Status: He is alert and oriented to person, place, and time  Motor: No weakness     Psychiatric:         Mood and Affect: Mood normal        Jean Paul Becerra PA-C

## 2023-03-03 ENCOUNTER — OFFICE VISIT (OUTPATIENT)
Dept: FAMILY MEDICINE CLINIC | Facility: CLINIC | Age: 58
End: 2023-03-03

## 2023-03-03 VITALS
DIASTOLIC BLOOD PRESSURE: 78 MMHG | HEART RATE: 74 BPM | RESPIRATION RATE: 15 BRPM | BODY MASS INDEX: 32.82 KG/M2 | OXYGEN SATURATION: 98 % | SYSTOLIC BLOOD PRESSURE: 122 MMHG | HEIGHT: 63 IN | WEIGHT: 185.2 LBS

## 2023-03-03 DIAGNOSIS — Z00.00 ANNUAL PHYSICAL EXAM: Primary | ICD-10-CM

## 2023-03-03 DIAGNOSIS — G89.29 CHRONIC LEFT SHOULDER PAIN: ICD-10-CM

## 2023-03-03 DIAGNOSIS — E55.9 VITAMIN D DEFICIENCY: ICD-10-CM

## 2023-03-03 DIAGNOSIS — M25.512 CHRONIC LEFT SHOULDER PAIN: ICD-10-CM

## 2023-03-03 RX ORDER — ERGOCALCIFEROL 1.25 MG/1
50000 CAPSULE ORAL WEEKLY
Qty: 8 CAPSULE | Refills: 0 | Status: SHIPPED | OUTPATIENT
Start: 2023-03-03 | End: 2023-04-22

## 2023-03-03 NOTE — PROGRESS NOTES
[facility]    NAME: Panda Browne  AGE: 65 y o  SEX: male  : 1965     DATE: 3/3/2023     Assessment and Plan:     Problem List Items Addressed This Visit        Other    BMI 33 0-33 9,adult     BMI Counseling: Body mass index is 33 33 kg/m²  The BMI is above normal  Nutrition recommendations include reducing portion sizes, decreasing overall calorie intake and 3-5 servings of fruits/vegetables daily  Exercise recommendations include vigorous aerobic physical activity for 75 minutes/week  Chronic left shoulder pain    Relevant Orders    Ambulatory Referral to Physical Therapy    XR shoulder 2+ vw left   Other Visit Diagnoses     Annual physical exam    -  Primary    Vitamin D deficiency        Relevant Medications    ergocalciferol (VITAMIN D2) 50,000 units          Immunizations and preventive care screenings were discussed with patient today  Appropriate education was printed on patient's after visit summary  Counseling:  Alcohol/drug use: discussed moderation in alcohol intake, the recommendations for healthy alcohol use, and avoidance of illicit drug use  Dental Health: discussed importance of regular tooth brushing, flossing, and dental visits  Injury prevention: discussed safety/seat belts, safety helmets, smoke detectors, carbon dioxide detectors, and smoking near bedding or upholstery  Sexual health: discussed sexually transmitted diseases, partner selection, use of condoms, avoidance of unintended pregnancy, and contraceptive alternatives  · Exercise: the importance of regular exercise/physical activity was discussed  Recommend exercise 3-5 times per week for at least 30 minutes  Depression Screening and Follow-up Plan: Patient was screened for depression during today's encounter  They screened negative with a PHQ-2 score of 0          Return in about 1 year (around 3/3/2024) for Annual physical      Chief Complaint:     Chief Complaint   Patient presents with   • Physical Exam      History of Present Illness:     Adult Annual Physical   Patient here for a comprehensive physical exam      Diet and Physical Activity  · Diet/Nutrition: well balanced diet  · Exercise: moderate cardiovascular exercise  Depression Screening  PHQ-2/9 Depression Screening    Little interest or pleasure in doing things: 0 - not at all  Feeling down, depressed, or hopeless: 0 - not at all  PHQ-2 Score: 0  PHQ-2 Interpretation: Negative depression screen          Review of Systems:     Review of Systems   Constitutional: Negative for chills and fever  HENT: Negative for congestion, postnasal drip, rhinorrhea and sinus pain  Eyes: Negative for photophobia and visual disturbance  Respiratory: Negative for cough and shortness of breath  Cardiovascular: Negative for chest pain, palpitations and leg swelling  Gastrointestinal: Negative for abdominal pain, constipation, diarrhea, nausea and vomiting  Genitourinary: Negative for difficulty urinating and dysuria  Musculoskeletal: Positive for arthralgias  Negative for myalgias  Skin: Negative for rash  Neurological: Negative for dizziness and syncope        Past Medical History:     Past Medical History:   Diagnosis Date   • Allergic 1965    PCN red dots as a baby   • Chondromalacia of left patellofemoral joint    • Colon polyp       Past Surgical History:     Past Surgical History:   Procedure Laterality Date   • COLONOSCOPY  2018    Adenoma   • TONSILLECTOMY        Family History:     Family History   Problem Relation Age of Onset   • Heart defect Mother    • Stroke Mother         Mom  from stroke in  at age of 80   • Stroke Father         Lost right field of vision at [de-identified]years old and lived another 7 years fine   • Coronary artery disease Father    • Prostate cancer Father    • Diabetes Maternal Grandmother    • Diabetes Maternal Aunt    • Diabetes Paternal Uncle       Social History:     Social History     Socioeconomic History   • Marital status: /Civil Union     Spouse name: None   • Number of children: None   • Years of education: None   • Highest education level: None   Occupational History   • None   Tobacco Use   • Smoking status: Never   • Smokeless tobacco: Never   Vaping Use   • Vaping Use: Never used   Substance and Sexual Activity   • Alcohol use: Yes     Alcohol/week: 1 0 standard drink     Types: 1 Glasses of wine per week     Comment: wine once a month   • Drug use: Never   • Sexual activity: Yes     Partners: Female     Birth control/protection: None   Other Topics Concern   • None   Social History Narrative   • None     Social Determinants of Health     Financial Resource Strain: Not on file   Food Insecurity: Not on file   Transportation Needs: Not on file   Physical Activity: Not on file   Stress: Not on file   Social Connections: Not on file   Intimate Partner Violence: Not At Risk   • Fear of Current or Ex-Partner: No   • Emotionally Abused: No   • Physically Abused: No   • Sexually Abused: No   Housing Stability: Not on file      Current Medications:     Current Outpatient Medications   Medication Sig Dispense Refill   • ergocalciferol (VITAMIN D2) 50,000 units Take 1 capsule (50,000 Units total) by mouth once a week for 8 doses 8 capsule 0   • multivitamin (THERAGRAN) TABS Take 1 tablet by mouth daily     • fluticasone (FLONASE) 50 mcg/act nasal spray 2 sprays into each nostril daily (Patient not taking: Reported on 10/28/2022) 16 g 0     No current facility-administered medications for this visit  Allergies: Allergies   Allergen Reactions   • Penicillins      Patient was told by parents       Physical Exam:     /78   Pulse 74   Resp 15   Ht 5' 2 5" (1 588 m)   Wt 84 kg (185 lb 3 2 oz)   SpO2 98%   BMI 33 33 kg/m²     Physical Exam  Constitutional:       General: He is not in acute distress  Appearance: Normal appearance  He is not ill-appearing, toxic-appearing or diaphoretic  HENT:      Head: Normocephalic and atraumatic  Right Ear: Tympanic membrane and ear canal normal       Left Ear: Tympanic membrane and ear canal normal       Nose: Nose normal  No congestion  Mouth/Throat:      Mouth: Mucous membranes are moist       Pharynx: Oropharynx is clear  No oropharyngeal exudate  Eyes:      Extraocular Movements: Extraocular movements intact  Conjunctiva/sclera: Conjunctivae normal       Pupils: Pupils are equal, round, and reactive to light  Cardiovascular:      Rate and Rhythm: Normal rate and regular rhythm  Pulses: Normal pulses  Heart sounds: No murmur heard  Pulmonary:      Effort: Pulmonary effort is normal       Breath sounds: Normal breath sounds  No wheezing, rhonchi or rales  Abdominal:      General: Bowel sounds are normal  There is no distension  Palpations: Abdomen is soft  Tenderness: There is no abdominal tenderness  Musculoskeletal:         General: No swelling or tenderness  Normal range of motion  Cervical back: Normal range of motion and neck supple  Skin:     General: Skin is warm and dry  Capillary Refill: Capillary refill takes less than 2 seconds  Neurological:      General: No focal deficit present  Mental Status: He is alert and oriented to person, place, and time  Cranial Nerves: No cranial nerve deficit  Psychiatric:         Mood and Affect: Mood normal          Behavior: Behavior normal          Thought Content:  Thought content normal           Bren Nelson, DO  [address]

## 2023-03-03 NOTE — ASSESSMENT & PLAN NOTE
BMI Counseling: Body mass index is 33 33 kg/m²  The BMI is above normal  Nutrition recommendations include reducing portion sizes, decreasing overall calorie intake and 3-5 servings of fruits/vegetables daily  Exercise recommendations include vigorous aerobic physical activity for 75 minutes/week

## 2023-04-20 ENCOUNTER — APPOINTMENT (OUTPATIENT)
Dept: PHYSICAL THERAPY | Facility: CLINIC | Age: 58
End: 2023-04-20

## 2023-04-27 ENCOUNTER — OFFICE VISIT (OUTPATIENT)
Dept: PHYSICAL THERAPY | Facility: CLINIC | Age: 58
End: 2023-04-27

## 2023-04-27 DIAGNOSIS — M25.512 CHRONIC LEFT SHOULDER PAIN: Primary | ICD-10-CM

## 2023-04-27 DIAGNOSIS — G89.29 CHRONIC LEFT SHOULDER PAIN: Primary | ICD-10-CM

## 2023-04-27 NOTE — PROGRESS NOTES
"Daily Note     Today's date: 2023  Patient name: Savana Roche  : 1965  MRN: [id number]  Referring provider: Melany Lainez DO  Dx:   Encounter Diagnosis     ICD-10-CM    1  Chronic left shoulder pain  M25 512     G89 29                      Subjective: Patient reports feeling like his shoulder is getting stronger  No pain with home exercises  Overall feels the shoulder is feeling better      Objective: See treatment diary below      Assessment: Patient tolerated treatment well today  Note improved shoulder ER strength at endrange but continues to have limitations  Report of fatigue with theraband 90/90 shoulder external rotation  Continues to respond well with less pain after strengthening and loading the RTC   Will benefit from skilled PT to progress RTC strength and return to PLOF      Plan: Progress ER strength as tolerated     Precautions: None  Functional limitations: upper body workouts (overhead press, bench press primarily), lifting overhead  Impairments: L shoulder mobility, flexion, ER, and abduction strength  [facility] [email or web address]   POC expiration: 2023      Manuals           L GHJ AP glide mob 4'  Grade 4' 2' grade 4 3'  Grade 4          L shld posterior glide MWM PROM  2'            L shld distraction 2'            L GHJ inf glide 2'  Grade 4' 2' grade 4           L shoulder ER iso at 90 deg abd and 45 deg abd  10\"x10ea 10\"x10ea          L shld ER MRE 90 deg abd   10ea 10ea          L shld PROM in abd, flex, ER  8' 4'          Neuro Re-Ed                                                                                                        Ther Ex             sidelying ER HEP            Seated thoracic ext HEP            ER isometric HEP            Posterior capsule stretch HEP            Seated ER at 90 deg abd  0#x10  1#x20 2#x15  3#x10          facepulls at cable column             TB ER  RTB 2x15 RTB 3x15          Eccentric scaption            " "  Eccentric abd  2# 2x10 2# 1x10  3# 1x10          Body blade IR/ER at neutral abd  30\"x3  30\"x3          Body blade 90 deg abd   3x30\"          TB IR  GTB 2x15           Seated lat stretch at table  30\"x3           touchdowns  2x10 2x10          TB 90/90 ER   RTB 2x15          Ther Activity                                       Gait Training                                       Modalities                                            "

## 2023-04-28 ENCOUNTER — APPOINTMENT (OUTPATIENT)
Dept: PHYSICAL THERAPY | Facility: CLINIC | Age: 58
End: 2023-04-28

## 2023-05-01 ENCOUNTER — OFFICE VISIT (OUTPATIENT)
Dept: PHYSICAL THERAPY | Facility: CLINIC | Age: 58
End: 2023-05-01

## 2023-05-01 DIAGNOSIS — G89.29 CHRONIC LEFT SHOULDER PAIN: Primary | ICD-10-CM

## 2023-05-01 DIAGNOSIS — M25.512 CHRONIC LEFT SHOULDER PAIN: Primary | ICD-10-CM

## 2023-05-01 NOTE — PROGRESS NOTES
"Daily Note     Today's date: 2023  Patient name: Jorge Mohan  : 1965  MRN: [id number]  Referring provider: Mira Sánchez DO  Dx:   Encounter Diagnosis     ICD-10-CM    1  Chronic left shoulder pain  M25 512     G89 29                      Subjective: Patient reports shoulder is feeling good - feels ready to resume gym activities  Objective: See treatment diary below      Assessment: Patient tolerated treatment well today  Note improved shoulder ER strength at 90 degrees in 90 degrees of abduction and improved shoulder mobility  Progress stabilization exercises for the shoulder which he tolerated well  Possible discharge next visit        Plan: Possible discharge next visit     Precautions: None  Functional limitations: upper body workouts (overhead press, bench press primarily), lifting overhead  Impairments: L shoulder mobility, flexion, ER, and abduction strength  [facility] [email or web address]   POC expiration: 2023      Manuals  5         L GHJ AP glide mob 4'  Grade 4' 2' grade 4 3'  Grade 4 3'  Grade          L shld posterior glide MWM PROM  2'            L shld distraction 2'            L GHJ inf glide 2'  Grade 4' 2' grade 4           L shoulder ER iso at 90 deg abd and 45 deg abd  10\"x10ea 10\"x10ea 10\"x10         L shld ER MRE 90 deg abd   10ea 10ea 10ea         L shld PROM in abd, flex, ER  8' 4' 4'         Neuro Re-Ed                                                                                                        Ther Ex             sidelying ER HEP   3# 2x10         Seated thoracic ext HEP            ER isometric HEP            Posterior capsule stretch HEP            Seated ER at 90 deg abd  0#x10  1#x20 2#x15  3#x10          facepulls at cable column             TB ER  RTB 2x15 RTB 3x15 RTB 3x15         Eccentric scaption              Eccentric abd  2# 2x10 2# 1x10  3# 1x10          Body blade IR/ER at neutral abd  30\"x3  30\"x3 3x30\"         Body " "blade 90 deg abd   3x30\" 3x30\"         TB IR  GTB 2x15           Seated lat stretch at table  30\"x3           touchdowns  2x10 2x10          TB 90/90 ER   RTB 2x15 RTB 2x15         Push up plus    2x10         Plank on BOSU in full pushup position - lateral tilts and cw/ccw    10 ea         KB stabilization    10#  2 laps                                   Ther Activity                                       Gait Training                                       Modalities                                            "

## 2023-05-09 ENCOUNTER — OFFICE VISIT (OUTPATIENT)
Dept: PHYSICAL THERAPY | Facility: CLINIC | Age: 58
End: 2023-05-09

## 2023-05-09 DIAGNOSIS — G89.29 CHRONIC LEFT SHOULDER PAIN: Primary | ICD-10-CM

## 2023-05-09 DIAGNOSIS — M25.512 CHRONIC LEFT SHOULDER PAIN: Primary | ICD-10-CM

## 2023-05-09 NOTE — PROGRESS NOTES
"Daily Note     Today's date: 2023  Patient name: Nikki Hernandez  : 1965  MRN: [id number]  Referring provider: Nigel Butterfield DO  Dx:   Encounter Diagnosis     ICD-10-CM    1  Chronic left shoulder pain  M25 512     G89 29                      Subjective: Patient reports shoulder feels fatigued after lifting yesterday - notes feeling good with HEP overall  He had no pain with lifting - just fatigue  Objective: See treatment diary below      Assessment: Patient tolerated treatment well today  Continues to have good shoulder ER strength at end range that has progressed well since beginning PT  At this time he will be discharged to independent HEP as he no longer is having pain in his shoulder         Plan: Discharge     Precautions: None  Functional limitations: upper body workouts (overhead press, bench press primarily), lifting overhead  Impairments: L shoulder mobility, flexion, ER, and abduction strength  [facility] [email or web address]   POC expiration: 2023      Manuals  5        L GHJ AP glide mob 4'  Grade 4' 2' grade 4 3'  Grade 4 3'  Grade  4' grade 4        L shld posterior glide MWM PROM  2'            L shld distraction 2'            L GHJ inf glide 2'  Grade 4' 2' grade 4           L shoulder ER iso at 90 deg abd and 45 deg abd  10\"x10ea 10\"x10ea 10\"x10         L shld ER MRE 90 deg abd   10ea 10ea 10ea 10ea        L shld PROM in abd, flex, ER  8' 4' 4' 4'        Neuro Re-Ed                                                                                                        Ther Ex             sidelying ER HEP   3# 2x10         Seated thoracic ext HEP            ER isometric HEP            Posterior capsule stretch HEP            Seated ER at 90 deg abd  0#x10  1#x20 2#x15  3#x10  2#x10  3#x15        facepulls at cable column             TB ER  RTB 2x15 RTB 3x15 RTB 3x15 GTB 3x12        Eccentric scaption              Eccentric abd  2# 2x10 2# 1x10  3# 1x10 " " 3# 2x15        Body blade IR/ER at neutral abd  30\"x3  30\"x3 3x30\" 3x30\"        Body blade 90 deg abd   3x30\" 3x30\" 3x30\"        TB IR  GTB 2x15           Seated lat stretch at table  30\"x3           touchdowns  2x10 2x10          TB 90/90 ER   RTB 2x15 RTB 2x15 OTB 2x15        Push up plus    2x10 2x10        Plank on BOSU in full pushup position - lateral tilts and cw/ccw    10 ea         KB stabilization    10#  2 laps 10# 2 laps                                  Ther Activity                                       Gait Training                                       Modalities                                            "

## 2023-09-26 ENCOUNTER — APPOINTMENT (OUTPATIENT)
Dept: RADIOLOGY | Facility: HOSPITAL | Age: 58
End: 2023-09-26
Payer: COMMERCIAL

## 2023-09-26 ENCOUNTER — HOSPITAL ENCOUNTER (EMERGENCY)
Facility: HOSPITAL | Age: 58
Discharge: HOME | End: 2023-09-26
Attending: EMERGENCY MEDICINE | Admitting: EMERGENCY MEDICINE
Payer: COMMERCIAL

## 2023-09-26 ENCOUNTER — OFFICE VISIT (OUTPATIENT)
Dept: URGENT CARE | Facility: CLINIC | Age: 58
End: 2023-09-26
Payer: COMMERCIAL

## 2023-09-26 VITALS
HEART RATE: 72 BPM | SYSTOLIC BLOOD PRESSURE: 137 MMHG | OXYGEN SATURATION: 98 % | RESPIRATION RATE: 14 BRPM | TEMPERATURE: 97.9 F | DIASTOLIC BLOOD PRESSURE: 79 MMHG

## 2023-09-26 VITALS
DIASTOLIC BLOOD PRESSURE: 60 MMHG | OXYGEN SATURATION: 97 % | SYSTOLIC BLOOD PRESSURE: 137 MMHG | HEIGHT: 62 IN | TEMPERATURE: 100.1 F | RESPIRATION RATE: 18 BRPM | BODY MASS INDEX: 33.87 KG/M2 | HEART RATE: 80 BPM

## 2023-09-26 DIAGNOSIS — R07.9 CHEST PAIN, UNSPECIFIED TYPE: ICD-10-CM

## 2023-09-26 DIAGNOSIS — R00.2 PALPITATIONS: Primary | ICD-10-CM

## 2023-09-26 LAB
ALBUMIN SERPL BCP-MCNC: 4.4 G/DL (ref 3.5–5)
ALP SERPL-CCNC: 73 U/L (ref 34–104)
ALT SERPL W P-5'-P-CCNC: 19 U/L (ref 7–52)
ANION GAP SERPL CALCULATED.3IONS-SCNC: 10 MMOL/L
AST SERPL W P-5'-P-CCNC: 13 U/L (ref 13–39)
ATRIAL RATE: 66 BPM
ATRIAL RATE: 69 BPM
ATRIAL RATE: 69 BPM
BASOPHILS # BLD AUTO: 0.04 THOUSANDS/ÂΜL (ref 0–0.1)
BASOPHILS NFR BLD AUTO: 0 % (ref 0–1)
BILIRUB SERPL-MCNC: 0.54 MG/DL (ref 0.2–1)
BUN SERPL-MCNC: 15 MG/DL (ref 5–25)
CALCIUM SERPL-MCNC: 8.5 MG/DL (ref 8.4–10.2)
CARDIAC TROPONIN I PNL SERPL HS: <2 NG/L
CHLORIDE SERPL-SCNC: 105 MMOL/L (ref 96–108)
CO2 SERPL-SCNC: 19 MMOL/L (ref 21–32)
CREAT SERPL-MCNC: 0.83 MG/DL (ref 0.6–1.3)
EOSINOPHIL # BLD AUTO: 0.15 THOUSAND/ÂΜL (ref 0–0.61)
EOSINOPHIL NFR BLD AUTO: 2 % (ref 0–6)
ERYTHROCYTE [DISTWIDTH] IN BLOOD BY AUTOMATED COUNT: 11.8 % (ref 11.6–15.1)
GFR SERPL CREATININE-BSD FRML MDRD: 98 ML/MIN/1.73SQ M
GLUCOSE SERPL-MCNC: 89 MG/DL (ref 65–140)
HCT VFR BLD AUTO: 44.1 % (ref 36.5–49.3)
HGB BLD-MCNC: 15.3 G/DL (ref 12–17)
IMM GRANULOCYTES # BLD AUTO: 0.02 THOUSAND/UL (ref 0–0.2)
IMM GRANULOCYTES NFR BLD AUTO: 0 % (ref 0–2)
LYMPHOCYTES # BLD AUTO: 2.32 THOUSANDS/ÂΜL (ref 0.6–4.47)
LYMPHOCYTES NFR BLD AUTO: 25 % (ref 14–44)
MCH RBC QN AUTO: 30.1 PG (ref 26.8–34.3)
MCHC RBC AUTO-ENTMCNC: 34.7 G/DL (ref 31.4–37.4)
MCV RBC AUTO: 87 FL (ref 82–98)
MONOCYTES # BLD AUTO: 0.56 THOUSAND/ÂΜL (ref 0.17–1.22)
MONOCYTES NFR BLD AUTO: 6 % (ref 4–12)
NEUTROPHILS # BLD AUTO: 6.04 THOUSANDS/ÂΜL (ref 1.85–7.62)
NEUTS SEG NFR BLD AUTO: 67 % (ref 43–75)
NRBC BLD AUTO-RTO: 0 /100 WBCS
P AXIS: 146 DEGREES
P AXIS: 26 DEGREES
P AXIS: 37 DEGREES
PLATELET # BLD AUTO: 273 THOUSANDS/UL (ref 149–390)
PMV BLD AUTO: 9.5 FL (ref 8.9–12.7)
POTASSIUM SERPL-SCNC: 3.9 MMOL/L (ref 3.5–5.3)
PR INTERVAL: 156 MS
PR INTERVAL: 160 MS
PR INTERVAL: 168 MS
PROT SERPL-MCNC: 7.1 G/DL (ref 6.4–8.4)
QRS AXIS: 136 DEGREES
QRS AXIS: 48 DEGREES
QRS AXIS: 51 DEGREES
QRSD INTERVAL: 80 MS
QRSD INTERVAL: 80 MS
QRSD INTERVAL: 82 MS
QT INTERVAL: 394 MS
QT INTERVAL: 398 MS
QT INTERVAL: 402 MS
QTC INTERVAL: 413 MS
QTC INTERVAL: 426 MS
QTC INTERVAL: 430 MS
RBC # BLD AUTO: 5.09 MILLION/UL (ref 3.88–5.62)
SODIUM SERPL-SCNC: 134 MMOL/L (ref 135–147)
T WAVE AXIS: 120 DEGREES
T WAVE AXIS: 41 DEGREES
T WAVE AXIS: 56 DEGREES
VENTRICULAR RATE: 66 BPM
VENTRICULAR RATE: 69 BPM
VENTRICULAR RATE: 69 BPM
WBC # BLD AUTO: 9.13 THOUSAND/UL (ref 4.31–10.16)

## 2023-09-26 PROCEDURE — 99285 EMERGENCY DEPT VISIT HI MDM: CPT

## 2023-09-26 PROCEDURE — 99213 OFFICE O/P EST LOW 20 MIN: CPT | Performed by: PHYSICIAN ASSISTANT

## 2023-09-26 PROCEDURE — 85025 COMPLETE CBC W/AUTO DIFF WBC: CPT | Performed by: EMERGENCY MEDICINE

## 2023-09-26 PROCEDURE — 93010 ELECTROCARDIOGRAM REPORT: CPT | Performed by: INTERNAL MEDICINE

## 2023-09-26 PROCEDURE — 71046 X-RAY EXAM CHEST 2 VIEWS: CPT

## 2023-09-26 PROCEDURE — 93005 ELECTROCARDIOGRAM TRACING: CPT

## 2023-09-26 PROCEDURE — 99285 EMERGENCY DEPT VISIT HI MDM: CPT | Performed by: EMERGENCY MEDICINE

## 2023-09-26 PROCEDURE — 36415 COLL VENOUS BLD VENIPUNCTURE: CPT

## 2023-09-26 PROCEDURE — 93005 ELECTROCARDIOGRAM TRACING: CPT | Performed by: PHYSICIAN ASSISTANT

## 2023-09-26 PROCEDURE — 80053 COMPREHEN METABOLIC PANEL: CPT | Performed by: EMERGENCY MEDICINE

## 2023-09-26 PROCEDURE — 84484 ASSAY OF TROPONIN QUANT: CPT | Performed by: EMERGENCY MEDICINE

## 2023-09-26 NOTE — PROGRESS NOTES
[facility]        NAME: Edna Caicedo is a 65 y.o. male  : 1965    MRN: [id number]  DATE: 2023  TIME: 6:02 PM    /60 (BP Location: Left arm, Patient Position: Sitting)   Pulse 80   Temp 100.1 °F (37.8 °C)   Resp 18   Ht 5' 2" (1.575 m)   SpO2 97%   BMI 33.87 kg/m²     Assessment and Plan   Palpitations [R00.2]  1. Palpitations        2. Chest pain, unspecified type              Patient Instructions       Follow up with PCP in 3-5 days. Proceed to  ER if symptoms worsen. Chief Complaint     Chief Complaint   Patient presents with   • Chest Pain     Pt reports intermittent left sided chest pain with onset last night. States periods of racing sensation over the past week. Reports some difficulty breathing at times last night. History of Present Illness       Pt with 1 week of palpitations, and intermittent chest pain since yesterday  Central and left sided       Review of Systems   Review of Systems   Constitutional: Negative. HENT: Negative. Eyes: Negative. Respiratory: Positive for chest tightness. Cardiovascular: Positive for chest pain and palpitations. Gastrointestinal: Negative. Endocrine: Negative. Genitourinary: Negative. Musculoskeletal: Negative. Skin: Negative. Allergic/Immunologic: Negative. Neurological: Negative. Hematological: Negative. Psychiatric/Behavioral: Negative. All other systems reviewed and are negative.         Current Medications       Current Outpatient Medications:   •  multivitamin (THERAGRAN) TABS, Take 1 tablet by mouth daily, Disp: , Rfl:   •  ergocalciferol (VITAMIN D2) 50,000 units, Take 1 capsule (50,000 Units total) by mouth once a week for 8 doses, Disp: 8 capsule, Rfl: 0  •  fluticasone (FLONASE) 50 mcg/act nasal spray, 2 sprays into each nostril daily (Patient not taking: Reported on 10/28/2022), Disp: 16 g, Rfl: 0    Current Allergies     Allergies as of 2023 - Reviewed 2023   Allergen Reaction Noted   • Penicillins  2019            The following portions of the patient's history were reviewed and updated as appropriate: allergies, current medications, past family history, past medical history, past social history, past surgical history and problem list.     Past Medical History:   Diagnosis Date   • Allergic 1965    PCN red dots as a baby   • Chondromalacia of left patellofemoral joint    • Colon polyp        Past Surgical History:   Procedure Laterality Date   • COLONOSCOPY      Adenoma   • TONSILLECTOMY         Family History   Problem Relation Age of Onset   • Heart defect Mother    • Stroke Mother         Mom  from stroke in  at age of 80   • Stroke Father         Lost right field of vision at 80years old and lived another 7 years fine   • Coronary artery disease Father    • Prostate cancer Father    • Diabetes Maternal Grandmother    • Diabetes Maternal Aunt    • Diabetes Paternal Uncle          Medications have been verified. Objective   /60 (BP Location: Left arm, Patient Position: Sitting)   Pulse 80   Temp 100.1 °F (37.8 °C)   Resp 18   Ht 5' 2" (1.575 m)   SpO2 97%   BMI 33.87 kg/m²        Physical Exam     Physical Exam  Vitals and nursing note reviewed. Constitutional:       Appearance: He is well-developed and normal weight. Comments: Intermittent since yesterday  Minor tightness now   No smoking no dm no cholesterol problems no family hx of heart problems under the age of 48     ekg wnl  Discussed with pt er can do more detailed evaluation of heart with blood eval troponin eval   pt will go to er , declined ambulance transport    HENT:      Head: Normocephalic and atraumatic. Eyes:      Extraocular Movements: Extraocular movements intact. Pupils: Pupils are equal, round, and reactive to light. Cardiovascular:      Rate and Rhythm: Normal rate and regular rhythm. Heart sounds: Normal heart sounds. Pulmonary:      Effort: Pulmonary effort is normal.      Breath sounds: Normal breath sounds. Abdominal:      General: Bowel sounds are normal.      Palpations: Abdomen is soft. Musculoskeletal:         General: Normal range of motion. Cervical back: Normal range of motion and neck supple. Skin:     General: Skin is warm. Capillary Refill: Capillary refill takes less than 2 seconds. Neurological:      General: No focal deficit present. Mental Status: He is alert.

## 2023-09-27 NOTE — ED PROVIDER NOTES
History  Chief Complaint   Patient presents with    Chest Pain     Over past few weeks has been having palpitations. Last night had "tight" CP that "made me tear up." Today having a fluttering feeling, denies pain at this time. History from patient no significant past medical history. He complains of intermittent palpitations twitching sensations at his chest and lower legs. Symptoms started last night. They come on when he gets anxious and he is under a large amount of stress as his wife has health issues and he is trying to buy a home and having some difficulty with that. Currently no chest pain denies any trouble breathing did not take any medicine for the symptoms. He also did  Drink a large amount of caffeine. Prior to Admission Medications   Prescriptions Last Dose Informant Patient Reported?  Taking?   ergocalciferol (VITAMIN D2) 50,000 units   No No   Sig: Take 1 capsule (50,000 Units total) by mouth once a week for 8 doses   fluticasone (FLONASE) 50 mcg/act nasal spray  Self No No   Si sprays into each nostril daily   Patient not taking: Reported on 10/28/2022   multivitamin (THERAGRAN) TABS  Self Yes No   Sig: Take 1 tablet by mouth daily      Facility-Administered Medications: None       Past Medical History:   Diagnosis Date    Allergic     PCN red dots as a baby    Chondromalacia of left patellofemoral joint     Colon polyp        Past Surgical History:   Procedure Laterality Date    COLONOSCOPY  2018    Adenoma    TONSILLECTOMY         Family History   Problem Relation Age of Onset    Heart defect Mother     Stroke Mother         Mom  from stroke in  at age of 80    Stroke Father         Lost right field of vision at 80years old and lived another 7 years fine    Coronary artery disease Father     Prostate cancer Father     Diabetes Maternal Grandmother     Diabetes Maternal Aunt     Diabetes Paternal Uncle      I have reviewed and agree with the history as documented. E-Cigarette/Vaping    E-Cigarette Use Never User      E-Cigarette/Vaping Substances    Nicotine No     THC No     CBD No     Flavoring No     Other No     Unknown No      Social History     Tobacco Use    Smoking status: Never    Smokeless tobacco: Never   Vaping Use    Vaping Use: Never used   Substance Use Topics    Alcohol use: Yes     Alcohol/week: 1.0 standard drink of alcohol     Types: 1 Glasses of wine per week     Comment: wine once a month    Drug use: Never       Review of Systems   Constitutional:  Negative for chills and fever. HENT:  Negative for rhinorrhea and sore throat. Respiratory:  Negative for shortness of breath. Cardiovascular:  Positive for palpitations. Negative for chest pain. Gastrointestinal:  Negative for constipation, diarrhea, nausea and vomiting. Genitourinary:  Negative for dysuria and frequency. Skin:  Negative for rash. Psychiatric/Behavioral:  Negative for suicidal ideas. The patient is nervous/anxious. All other systems reviewed and are negative. Physical Exam  Physical Exam  Vitals and nursing note reviewed. Constitutional:       Appearance: He is well-developed. HENT:      Head: Normocephalic and atraumatic. Right Ear: External ear normal.      Left Ear: External ear normal.      Nose: Nose normal.   Eyes:      Conjunctiva/sclera: Conjunctivae normal.      Pupils: Pupils are equal, round, and reactive to light. Cardiovascular:      Rate and Rhythm: Normal rate and regular rhythm. Heart sounds: Normal heart sounds. Pulmonary:      Effort: Pulmonary effort is normal. No respiratory distress. Breath sounds: Normal breath sounds. No wheezing. Chest:      Chest wall: No mass or tenderness. Abdominal:      General: Bowel sounds are normal. There is no distension. Palpations: Abdomen is soft. Tenderness: There is no abdominal tenderness. Musculoskeletal:         General: No deformity. Normal range of motion. Cervical back: Normal range of motion and neck supple. No spinous process tenderness. Skin:     General: Skin is warm and dry. Findings: No rash. Neurological:      General: No focal deficit present. Mental Status: He is alert. GCS: GCS eye subscore is 4. GCS verbal subscore is 5. GCS motor subscore is 6. Sensory: No sensory deficit. Psychiatric:         Mood and Affect: Mood is anxious.          Vital Signs  ED Triage Vitals [09/26/23 1813]   Temperature Pulse Respirations Blood Pressure SpO2   97.9 °F (36.6 °C) 74 20 137/79 98 %      Temp Source Heart Rate Source Patient Position - Orthostatic VS BP Location FiO2 (%)   Temporal Monitor Sitting Left arm --      Pain Score       No Pain           Vitals:    09/26/23 1813 09/26/23 2232   BP: 137/79    Pulse: 74 72   Patient Position - Orthostatic VS: Sitting          Visual Acuity      ED Medications  Medications - No data to display    Diagnostic Studies  Results Reviewed       Procedure Component Value Units Date/Time    HS Troponin 0hr (reflex protocol) [[id number]]  (Normal) Collected: 09/26/23 1821    Lab Status: Final result Specimen: Blood from Arm, Right Updated: 09/26/23 1856     hs TnI 0hr <2 ng/L     Comprehensive metabolic panel [[id number]]  (Abnormal) Collected: 09/26/23 1821    Lab Status: Final result Specimen: Blood from Arm, Right Updated: 09/26/23 1847     Sodium 134 mmol/L      Potassium 3.9 mmol/L      Chloride 105 mmol/L      CO2 19 mmol/L      ANION GAP 10 mmol/L      BUN 15 mg/dL      Creatinine 0.83 mg/dL      Glucose 89 mg/dL      Calcium 8.5 mg/dL      AST 13 U/L      ALT 19 U/L      Alkaline Phosphatase 73 U/L      Total Protein 7.1 g/dL      Albumin 4.4 g/dL      Total Bilirubin 0.54 mg/dL      eGFR 98 ml/min/1.73sq m     Narrative:      [place] guidelines for Chronic Kidney Disease (CKD):     Stage 1 with normal or high GFR (GFR > 90 mL/min/1.73 square meters)    Stage 2 Mild CKD (GFR = 60-89 mL/min/1.73 square meters)    Stage 3A Moderate CKD (GFR = 45-59 mL/min/1.73 square meters)    Stage 3B Moderate CKD (GFR = 30-44 mL/min/1.73 square meters)    Stage 4 Severe CKD (GFR = 15-29 mL/min/1.73 square meters)    Stage 5 End Stage CKD (GFR <15 mL/min/1.73 square meters)  Note: GFR calculation is accurate only with a steady state creatinine    CBC and differential [[id number]] Collected: 09/26/23 1821    Lab Status: Final result Specimen: Blood from Arm, Right Updated: 09/26/23 1832     WBC 9.13 Thousand/uL      RBC 5.09 Million/uL      Hemoglobin 15.3 g/dL      Hematocrit 44.1 %      MCV 87 fL      MCH 30.1 pg      MCHC 34.7 g/dL      RDW 11.8 %      MPV 9.5 fL      Platelets 123 Thousands/uL      nRBC 0 /100 WBCs      Neutrophils Relative 67 %      Immat GRANS % 0 %      Lymphocytes Relative 25 %      Monocytes Relative 6 %      Eosinophils Relative 2 %      Basophils Relative 0 %      Neutrophils Absolute 6.04 Thousands/µL      Immature Grans Absolute 0.02 Thousand/uL      Lymphocytes Absolute 2.32 Thousands/µL      Monocytes Absolute 0.56 Thousand/µL      Eosinophils Absolute 0.15 Thousand/µL      Basophils Absolute 0.04 Thousands/µL                    XR chest 2 views   ED Interpretation by Herminio Black DO (09/26 2206)   No acute abnl                 Procedures  ECG 12 Lead Documentation Only    Date/Time: 9/26/2023 10:29 PM    Performed by: Herminio Black DO  Authorized by: Herminio Black DO    Indications / Diagnosis:  Chest palpitations  ECG reviewed by me, the ED Provider: yes    Patient location:  ED  Previous ECG:     Previous ECG:  Unavailable  Interpretation:     Interpretation: normal    Rate:     ECG rate:  66    ECG rate assessment: normal    Rhythm:     Rhythm: sinus rhythm    Ectopy:     Ectopy: none    QRS:     QRS axis:  Normal    QRS intervals:  Normal  Conduction:     Conduction: normal    ST segments:     ST segments:  Normal  T waves:     T waves: normal    Comments: This EKG was interpreted by me. ED Course                                             Medical Decision Making  Differential includes anxiety, less likely muscle spasms or fasciculations less likely a significant electrolyte abnormality coronary syndrome or arrhythmia. Amount and/or Complexity of Data Reviewed  Labs: ordered. Radiology: ordered and independent interpretation performed. Disposition  Final diagnoses:   Palpitations     Time reflects when diagnosis was documented in both MDM as applicable and the Disposition within this note       Time User Action Codes Description Comment    9/26/2023 10:19 PM Annalise Doeus Add [R00.2] Palpitations     9/26/2023 10:20 PM Annalise Omer Add [J36.936] Muscle spasm     9/26/2023 10:21 PM Con Cunningham [T84.214] Muscle spasm           ED Disposition       ED Disposition   Discharge    Condition   Stable    Date/Time   Tue Sep 26, 2023 10:19 PM    Comment   Laure Browne discharge to home/self care. Follow-up Information       Follow up With Specialties Details Why [facility],  Family Medicine Schedule an appointment as soon as possible for a visit   [address] [facility] [address]  [address] [place] [address]  [phone]              Discharge Medication List as of 9/26/2023 10:26 PM        CONTINUE these medications which have NOT CHANGED    Details   ergocalciferol (VITAMIN D2) 50,000 units Take 1 capsule (50,000 Units total) by mouth once a week for 8 doses, Starting Fri 3/3/2023, Until Sat 4/22/2023, Normal      fluticasone (FLONASE) 50 mcg/act nasal spray 2 sprays into each nostril daily, Starting Sun 2/6/2022, Normal      multivitamin (THERAGRAN) TABS Take 1 tablet by mouth daily, Historical Med             No discharge procedures on file.     PDMP Review       None            ED Provider  Electronically Signed by           Kimberly Felton DO  09/26/23 8900

## 2023-09-27 NOTE — ED NOTES
Awake, alert. Denies chest pain. Resps easy and regular. BBS CTA. Pt admits to very high stress this week d/t multiple issues.          Umu Jennings, [address]  09/26/23 6408

## 2023-10-06 ENCOUNTER — OFFICE VISIT (OUTPATIENT)
Dept: FAMILY MEDICINE CLINIC | Facility: CLINIC | Age: 58
End: 2023-10-06
Payer: COMMERCIAL

## 2023-10-06 VITALS
BODY MASS INDEX: 35.63 KG/M2 | HEART RATE: 78 BPM | OXYGEN SATURATION: 97 % | DIASTOLIC BLOOD PRESSURE: 80 MMHG | RESPIRATION RATE: 17 BRPM | HEIGHT: 62 IN | SYSTOLIC BLOOD PRESSURE: 128 MMHG | TEMPERATURE: 98.3 F | WEIGHT: 193.6 LBS

## 2023-10-06 DIAGNOSIS — E78.2 MIXED HYPERLIPIDEMIA: ICD-10-CM

## 2023-10-06 DIAGNOSIS — Z13.6 SCREENING FOR CARDIOVASCULAR CONDITION: ICD-10-CM

## 2023-10-06 DIAGNOSIS — R07.89 OTHER CHEST PAIN: Primary | ICD-10-CM

## 2023-10-06 PROCEDURE — 99214 OFFICE O/P EST MOD 30 MIN: CPT | Performed by: FAMILY MEDICINE

## 2023-10-06 NOTE — PROGRESS NOTES
Alberto Jasmine 1965 male MRN: [id number]    Family Medicine Follow-up Visit    ASSESSMENT/PLAN  Problem List Items Addressed This Visit        Other    Other chest pain - Primary    Relevant Orders    Stress test only, exercise    CT coronary calcium score   Other Visit Diagnoses     Screening for cardiovascular condition        Relevant Orders    Stress test only, exercise    CT coronary calcium score    Mixed hyperlipidemia        Relevant Orders    Stress test only, exercise    CT coronary calcium score          ER work up was reassuring however patient does have family history of cad and still having intermittent symptoms. Will check stress and Ct scan. Consider echo/holter if needed. Cardiology referral pending results. No future appointments. SUBJECTIVE  CC: ER Follow up (Pt was in [facility] on 9/26/23 with chest pain/palpitations. Reports feeling better however still has some tightness in chest. )      HPI:  Alberto Jasmine is a 65 y.o. male who presents for ER follow up. Was having chest x 1 week at work. 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 Not sleeping enough. He was having muscle spasms on the side of his chest. He went to urgent care and was sent to the ER    HPI    Review of Systems   Constitutional: Negative for chills and fever. HENT: Negative for congestion, postnasal drip, rhinorrhea and sinus pain. Eyes: Negative for photophobia and visual disturbance. Respiratory: Negative for cough and shortness of breath. Cardiovascular: Positive for chest pain. Negative for palpitations and leg swelling. Gastrointestinal: Negative for abdominal pain, constipation, diarrhea, nausea and vomiting. Genitourinary: Negative for difficulty urinating and dysuria. Musculoskeletal: Negative for arthralgias and myalgias. Skin: Negative for rash. Neurological: Negative for dizziness and syncope.        Historical Information   The patient history was reviewed as follows:    Past Medical History:   Diagnosis Date   • Allergic 1965    PCN red dots as a baby   • Chondromalacia of left patellofemoral joint    • Colon polyp      Past Surgical History:   Procedure Laterality Date   • COLONOSCOPY  2018    Adenoma   • EYE SURGERY      [address] [facility]   • TONSILLECTOMY       Family History   Problem Relation Age of Onset   • Heart defect Mother    • Stroke Mother         Mom  from stroke in  at age of 80   • Hearing loss Mother         As child from scarlet fever   • Anxiety disorder Mother         3 of 15 siblings raised in poverty and poor health   • Stroke Father         Lost right field of vision at 80years old and lived another 7 years fine   • Coronary artery disease Father    • Prostate cancer Father    • COPD Father    • Diabetes Maternal Grandmother    • Diabetes Maternal Aunt    • Diabetes Paternal Uncle       Social History   Social History     Substance and Sexual Activity   Alcohol Use Yes   • Alcohol/week: 1.0 standard drink of alcohol   • Types: 1 Glasses of wine per week    Comment: wine once a month     Social History     Substance and Sexual Activity   Drug Use Never     Social History     Tobacco Use   Smoking Status Never   Smokeless Tobacco Never       Medications:     Current Outpatient Medications:   •  multivitamin (THERAGRAN) TABS, Take 1 tablet by mouth daily, Disp: , Rfl:   •  ergocalciferol (VITAMIN D2) 50,000 units, Take 1 capsule (50,000 Units total) by mouth once a week for 8 doses, Disp: 8 capsule, Rfl: 0  •  fluticasone (FLONASE) 50 mcg/act nasal spray, 2 sprays into each nostril daily (Patient not taking: Reported on 10/28/2022), Disp: 16 g, Rfl: 0  Allergies   Allergen Reactions   • Penicillins      Patient was told by parents        OBJECTIVE    Vitals:   Vitals:    10/06/23 0836   BP: 128/80   BP Location: Right arm   Patient Position: Sitting   Cuff Size: Large   Pulse: 78   Resp: 17   Temp: 98.3 °F (36.8 °C)   TempSrc: Tympanic   SpO2: 97%   Weight: 87.8 kg (193 lb 9.6 oz)   Height: 5' 2" (1.575 m)           Physical Exam  Constitutional:       Appearance: He is well-developed. HENT:      Head: Normocephalic and atraumatic. Right Ear: External ear normal.      Left Ear: External ear normal.   Eyes:      Extraocular Movements: Extraocular movements intact. Conjunctiva/sclera: Conjunctivae normal.   Cardiovascular:      Rate and Rhythm: Normal rate and regular rhythm. Heart sounds: Normal heart sounds. No murmur heard. Pulmonary:      Effort: Pulmonary effort is normal. No respiratory distress. Breath sounds: Normal breath sounds. No wheezing. Musculoskeletal:         General: Normal range of motion. Cervical back: Normal range of motion and neck supple. Skin:     General: Skin is warm and dry. Neurological:      Mental Status: He is alert and oriented to person, place, and time.    Psychiatric:         Mood and Affect: Mood normal.         Behavior: Behavior normal.            Labs:        DO Jaquan    10/6/2023

## 2023-10-11 ENCOUNTER — HOSPITAL ENCOUNTER (OUTPATIENT)
Dept: CT IMAGING | Facility: HOSPITAL | Age: 58
Discharge: HOME | End: 2023-10-11
Payer: COMMERCIAL

## 2023-10-11 DIAGNOSIS — E78.2 MIXED HYPERLIPIDEMIA: ICD-10-CM

## 2023-10-11 DIAGNOSIS — R07.89 OTHER CHEST PAIN: ICD-10-CM

## 2023-10-11 DIAGNOSIS — Z13.6 SCREENING FOR CARDIOVASCULAR CONDITION: ICD-10-CM

## 2023-10-11 PROCEDURE — G1004 CDSM NDSC: HCPCS

## 2023-10-11 PROCEDURE — 75571 CT HRT W/O DYE W/CA TEST: CPT

## 2023-10-16 ENCOUNTER — HOSPITAL ENCOUNTER (OUTPATIENT)
Dept: NON INVASIVE DIAGNOSTICS | Age: 58
Discharge: HOME | End: 2023-10-16
Payer: COMMERCIAL

## 2023-10-16 DIAGNOSIS — R07.89 OTHER CHEST PAIN: ICD-10-CM

## 2023-10-16 DIAGNOSIS — Z13.6 SCREENING FOR CARDIOVASCULAR CONDITION: ICD-10-CM

## 2023-10-16 DIAGNOSIS — E78.2 MIXED HYPERLIPIDEMIA: ICD-10-CM

## 2023-10-16 LAB
MAX HR PERCENT: 104 %
MAX HR: 171 BPM
RATE PRESSURE PRODUCT: NORMAL
SL CV STRESS STAGE REACHED: 4
STRESS ANGINA INDEX: 0
STRESS BASELINE HR: 82 BPM
STRESS PEAK HR: 171 BPM
STRESS POST ESTIMATED WORKLOAD: 13.4 METS
STRESS POST EXERCISE DUR MIN: 12 MIN
STRESS POST EXERCISE DUR SEC: 0 SEC
STRESS POST PEAK BP: 160 MMHG

## 2023-10-16 PROCEDURE — 93017 CV STRESS TEST TRACING ONLY: CPT

## 2023-10-16 PROCEDURE — 93018 CV STRESS TEST I&R ONLY: CPT | Performed by: INTERNAL MEDICINE

## 2023-10-16 PROCEDURE — 93016 CV STRESS TEST SUPVJ ONLY: CPT | Performed by: INTERNAL MEDICINE

## 2023-10-17 LAB
CHEST PAIN STATEMENT: NORMAL
MAX DIASTOLIC BP: 80 MMHG
MAX HEART RATE: 171 BPM
MAX PREDICTED HEART RATE: 164 BPM
MAX. SYSTOLIC BP: 160 MMHG
PROTOCOL NAME: NORMAL
REASON FOR TERMINATION: NORMAL
TARGET HR FORMULA: NORMAL
TEST INDICATION: NORMAL
TIME IN EXERCISE PHASE: NORMAL

## 2024-07-23 ENCOUNTER — OFFICE VISIT (OUTPATIENT)
Dept: FAMILY MEDICINE CLINIC | Facility: CLINIC | Age: 59
End: 2024-07-23
Payer: COMMERCIAL

## 2024-07-23 ENCOUNTER — APPOINTMENT (OUTPATIENT)
Dept: LAB | Facility: CLINIC | Age: 59
End: 2024-07-23
Payer: COMMERCIAL

## 2024-07-23 VITALS
DIASTOLIC BLOOD PRESSURE: 82 MMHG | HEART RATE: 80 BPM | BODY MASS INDEX: 32.5 KG/M2 | SYSTOLIC BLOOD PRESSURE: 116 MMHG | TEMPERATURE: 98.6 F | HEIGHT: 62 IN | OXYGEN SATURATION: 96 % | WEIGHT: 176.6 LBS | RESPIRATION RATE: 18 BRPM

## 2024-07-23 DIAGNOSIS — Z11.4 SCREENING FOR HIV (HUMAN IMMUNODEFICIENCY VIRUS): ICD-10-CM

## 2024-07-23 DIAGNOSIS — Z11.59 NEED FOR HEPATITIS C SCREENING TEST: ICD-10-CM

## 2024-07-23 DIAGNOSIS — R19.7 DIARRHEA OF PRESUMED INFECTIOUS ORIGIN: ICD-10-CM

## 2024-07-23 DIAGNOSIS — Z12.5 SCREENING FOR PROSTATE CANCER: ICD-10-CM

## 2024-07-23 DIAGNOSIS — Z13.6 SCREENING FOR CARDIOVASCULAR CONDITION: ICD-10-CM

## 2024-07-23 DIAGNOSIS — Z00.00 ANNUAL PHYSICAL EXAM: ICD-10-CM

## 2024-07-23 DIAGNOSIS — Z13.1 SCREENING FOR DIABETES MELLITUS: ICD-10-CM

## 2024-07-23 DIAGNOSIS — Z00.00 ANNUAL PHYSICAL EXAM: Primary | ICD-10-CM

## 2024-07-23 LAB
ALBUMIN SERPL BCG-MCNC: 4.2 G/DL (ref 3.5–5)
ALP SERPL-CCNC: 66 U/L (ref 34–104)
ALT SERPL W P-5'-P-CCNC: 18 U/L (ref 7–52)
ANION GAP SERPL CALCULATED.3IONS-SCNC: 10 MMOL/L (ref 4–13)
AST SERPL W P-5'-P-CCNC: 18 U/L (ref 13–39)
BACTERIA UR QL AUTO: NORMAL /HPF
BASOPHILS # BLD AUTO: 0.04 THOUSANDS/ÂΜL (ref 0–0.1)
BASOPHILS NFR BLD AUTO: 1 % (ref 0–1)
BILIRUB SERPL-MCNC: 0.81 MG/DL (ref 0.2–1)
BILIRUB UR QL STRIP: NEGATIVE
BUN SERPL-MCNC: 12 MG/DL (ref 5–25)
CALCIUM SERPL-MCNC: 9 MG/DL (ref 8.4–10.2)
CHLORIDE SERPL-SCNC: 106 MMOL/L (ref 96–108)
CHOLEST SERPL-MCNC: 155 MG/DL
CLARITY UR: CLEAR
CO2 SERPL-SCNC: 25 MMOL/L (ref 21–32)
COLOR UR: ABNORMAL
CREAT SERPL-MCNC: 0.73 MG/DL (ref 0.6–1.3)
EOSINOPHIL # BLD AUTO: 0.11 THOUSAND/ÂΜL (ref 0–0.61)
EOSINOPHIL NFR BLD AUTO: 2 % (ref 0–6)
ERYTHROCYTE [DISTWIDTH] IN BLOOD BY AUTOMATED COUNT: 12.1 % (ref 11.6–15.1)
GFR SERPL CREATININE-BSD FRML MDRD: 102 ML/MIN/1.73SQ M
GLUCOSE P FAST SERPL-MCNC: 102 MG/DL (ref 65–99)
GLUCOSE UR STRIP-MCNC: NEGATIVE MG/DL
HCT VFR BLD AUTO: 44.8 % (ref 36.5–49.3)
HCV AB SER QL: NORMAL
HDLC SERPL-MCNC: 43 MG/DL
HGB BLD-MCNC: 16 G/DL (ref 12–17)
HGB UR QL STRIP.AUTO: NEGATIVE
HIV 1+2 AB+HIV1 P24 AG SERPL QL IA: NORMAL
HIV 2 AB SERPL QL IA: NORMAL
HIV1 AB SERPL QL IA: NORMAL
HIV1 P24 AG SERPL QL IA: NORMAL
IMM GRANULOCYTES # BLD AUTO: 0.03 THOUSAND/UL (ref 0–0.2)
IMM GRANULOCYTES NFR BLD AUTO: 0 % (ref 0–2)
KETONES UR STRIP-MCNC: NEGATIVE MG/DL
LDLC SERPL CALC-MCNC: 96 MG/DL (ref 0–100)
LEUKOCYTE ESTERASE UR QL STRIP: NEGATIVE
LIPASE SERPL-CCNC: 23 U/L (ref 11–82)
LYMPHOCYTES # BLD AUTO: 1.48 THOUSANDS/ÂΜL (ref 0.6–4.47)
LYMPHOCYTES NFR BLD AUTO: 21 % (ref 14–44)
MCH RBC QN AUTO: 32.3 PG (ref 26.8–34.3)
MCHC RBC AUTO-ENTMCNC: 35.7 G/DL (ref 31.4–37.4)
MCV RBC AUTO: 91 FL (ref 82–98)
MONOCYTES # BLD AUTO: 0.42 THOUSAND/ÂΜL (ref 0.17–1.22)
MONOCYTES NFR BLD AUTO: 6 % (ref 4–12)
NEUTROPHILS # BLD AUTO: 4.96 THOUSANDS/ÂΜL (ref 1.85–7.62)
NEUTS SEG NFR BLD AUTO: 70 % (ref 43–75)
NITRITE UR QL STRIP: NEGATIVE
NON-SQ EPI CELLS URNS QL MICRO: NORMAL /HPF
NONHDLC SERPL-MCNC: 112 MG/DL
NRBC BLD AUTO-RTO: 0 /100 WBCS
PH UR STRIP.AUTO: 6.5 [PH]
PLATELET # BLD AUTO: 303 THOUSANDS/UL (ref 149–390)
PMV BLD AUTO: 10.8 FL (ref 8.9–12.7)
POTASSIUM SERPL-SCNC: 4.6 MMOL/L (ref 3.5–5.3)
PROT SERPL-MCNC: 7 G/DL (ref 6.4–8.4)
PROT UR STRIP-MCNC: ABNORMAL MG/DL
PSA SERPL-MCNC: 2.63 NG/ML (ref 0–4)
RBC # BLD AUTO: 4.95 MILLION/UL (ref 3.88–5.62)
RBC #/AREA URNS AUTO: NORMAL /HPF
SODIUM SERPL-SCNC: 141 MMOL/L (ref 135–147)
SP GR UR STRIP.AUTO: 1.02 (ref 1–1.03)
TRIGL SERPL-MCNC: 82 MG/DL
TSH SERPL DL<=0.05 MIU/L-ACNC: 4.18 UIU/ML (ref 0.45–4.5)
UROBILINOGEN UR STRIP-ACNC: <2 MG/DL
WBC # BLD AUTO: 7.04 THOUSAND/UL (ref 4.31–10.16)
WBC #/AREA URNS AUTO: NORMAL /HPF

## 2024-07-23 PROCEDURE — 80061 LIPID PANEL: CPT

## 2024-07-23 PROCEDURE — 86803 HEPATITIS C AB TEST: CPT

## 2024-07-23 PROCEDURE — 99396 PREV VISIT EST AGE 40-64: CPT | Performed by: FAMILY MEDICINE

## 2024-07-23 PROCEDURE — 99214 OFFICE O/P EST MOD 30 MIN: CPT | Performed by: FAMILY MEDICINE

## 2024-07-23 PROCEDURE — 87505 NFCT AGENT DETECTION GI: CPT

## 2024-07-23 PROCEDURE — 80053 COMPREHEN METABOLIC PANEL: CPT

## 2024-07-23 PROCEDURE — 87389 HIV-1 AG W/HIV-1&-2 AB AG IA: CPT

## 2024-07-23 PROCEDURE — 84443 ASSAY THYROID STIM HORMONE: CPT

## 2024-07-23 PROCEDURE — 36415 COLL VENOUS BLD VENIPUNCTURE: CPT

## 2024-07-23 PROCEDURE — G0103 PSA SCREENING: HCPCS

## 2024-07-23 PROCEDURE — 87086 URINE CULTURE/COLONY COUNT: CPT

## 2024-07-23 PROCEDURE — 81001 URINALYSIS AUTO W/SCOPE: CPT

## 2024-07-23 PROCEDURE — 85025 COMPLETE CBC W/AUTO DIFF WBC: CPT

## 2024-07-23 PROCEDURE — 83690 ASSAY OF LIPASE: CPT

## 2024-07-23 NOTE — PATIENT INSTRUCTIONS
"Patient Education     Routine physical for adults   The Basics   Written by the doctors and editors at [facility]   What is a physical? -- A physical is a routine visit, or \"check-up,\" with your doctor. You might also hear it called a \"wellness visit\" or \"preventive visit.\"  During each visit, the doctor will:   Ask about your physical and mental health   Ask about your habits, behaviors, and lifestyle   Do an exam   Give you vaccines if needed   Talk to you about any medicines you take   Give advice about your health   Answer your questions  Getting regular check-ups is an important part of taking care of your health. It can help your doctor find and treat any problems you have. But it's also important for preventing health problems.  A routine physical is different from a \"sick visit.\" A sick visit is when you see a doctor because of a health concern or problem. Since physicals are scheduled ahead of time, you can think about what you want to ask the doctor.  How often should I get a physical? -- It depends on your age and health. In general, for people age 21 years and older:   If you are younger than 50 years, you might be able to get a physical every 3 years.   If you are 50 years or older, your doctor might recommend a physical every year.  If you have an ongoing health condition, like diabetes or high blood pressure, your doctor will probably want to see you more often.  What happens during a physical? -- In general, each visit will include:   Physical exam - The doctor or nurse will check your height, weight, heart rate, and blood pressure. They will also look at your eyes and ears. They will ask about how you are feeling and whether you have any symptoms that bother you.   Medicines - It's a good idea to bring a list of all the medicines you take to each doctor visit. Your doctor will talk to you about your medicines and answer any questions. Tell them if you are having any side effects that bother you. You " "should also tell them if you are having trouble paying for any of your medicines.   Habits and behaviors - This includes:   Your diet   Your exercise habits   Whether you smoke, drink alcohol, or use drugs   Whether you are sexually active   Whether you feel safe at home  Your doctor will talk to you about things you can do to improve your health and lower your risk of health problems. They will also offer help and support. For example, if you want to quit smoking, they can give you advice and might prescribe medicines. If you want to improve your diet or get more physical activity, they can help you with this, too.   Lab tests, if needed - The tests you get will depend on your age and situation. For example, your doctor might want to check your:   Cholesterol   Blood sugar   Iron level   Vaccines - The recommended vaccines will depend on your age, health, and what vaccines you already had. Vaccines are very important because they can prevent certain serious or deadly infections.   Discussion of screening - \"Screening\" means checking for diseases or other health problems before they cause symptoms. Your doctor can recommend screening based on your age, risk, and preferences. This might include tests to check for:   Cancer, such as breast, prostate, cervical, ovarian, colorectal, prostate, lung, or skin cancer   Sexually transmitted infections, such as chlamydia and gonorrhea   Mental health conditions like depression and anxiety  Your doctor will talk to you about the different types of screening tests. They can help you decide which screenings to have. They can also explain what the results might mean.   Answering questions - The physical is a good time to ask the doctor or nurse questions about your health. If needed, they can refer you to other doctors or specialists, too.  Adults older than 65 years often need other care, too. As you get older, your doctor will talk to you about:   How to prevent falling at " home   Hearing or vision tests   Memory testing   How to take your medicines safely   Making sure that you have the help and support you need at home  All topics are updated as new evidence becomes available and our peer review process is complete.  This topic retrieved from [employer] on: May 02, 2024.  Topic 221575 Version 1.0  Release: 32.4.3 - C32.122  © 2024 UpToDate, Inc. and/or its affiliates. All rights reserved.  Consumer Information Use and Disclaimer   Disclaimer: This generalized information is a limited summary of diagnosis, treatment, and/or medication information. It is not meant to be comprehensive and should be used as a tool to help the user understand and/or assess potential diagnostic and treatment options. It does NOT include all information about conditions, treatments, medications, side effects, or risks that may apply to a specific patient. It is not intended to be medical advice or a substitute for the medical advice, diagnosis, or treatment of a health care provider based on the health care provider's examination and assessment of a patient's specific and unique circumstances. Patients must speak with a health care provider for complete information about their health, medical questions, and treatment options, including any risks or benefits regarding use of medications. This information does not endorse any treatments or medications as safe, effective, or approved for treating a specific patient. UpToDate, Inc. and its affiliates disclaim any warranty or liability relating to this information or the use thereof.The use of this information is governed by the Terms of Use, available at https://www.woltersMedliouwer.com/en/know/clinical-effectiveness-terms. 2024© UpToDate, Inc. and its affiliates and/or licensors. All rights reserved.  Copyright   © 2024 UpToDate, Inc. and/or its affiliates. All rights reserved.

## 2024-07-23 NOTE — PROGRESS NOTES
Adult Annual Physical  Name: Jass Browne      : 1965      MRN: [id number]  Encounter Provider: Angela Stoll DO  Encounter Date: 2024   Encounter department: [facility]    Assessment & Plan   1. Annual physical exam  -     Lipid panel; Future  -     Comprehensive metabolic panel; Future  -     CBC and differential; Future  -     TSH, 3rd generation with Free T4 reflex; Future  2. Screening for prostate cancer  -     PSA, Total Screen; Future  3. Screening for diabetes mellitus  -     Lipid panel; Future  -     Comprehensive metabolic panel; Future  4. Screening for cardiovascular condition  -     Lipid panel; Future  -     Comprehensive metabolic panel; Future  5. Screening for HIV (human immunodeficiency virus)  -     HIV 1/2 AB/AG w Reflex [facility] for 2 yr old and above; Future  6. Need for hepatitis C screening test  -     Hepatitis C antibody; Future  7. Diarrhea of presumed infectious origin  Assessment & Plan:  Jass has had diarrhea for 3 weeks now ever since returning from vacation  We will do labs and stool studies  We discussed imaging if needed  Orders:  -     UA (URINE) with reflex to Scope; Future  -     Urine culture; Future  -     Lipase; Future  -     Stool Enteric Bacterial Panel by PCR; Future    Immunizations and preventive care screenings were discussed with patient today. Appropriate education was printed on patient's after visit summary.    Discussed risks and benefits of prostate cancer screening. We discussed the controversial history of PSA screening for prostate cancer in the [place] as well as the risk of over detection and over treatment of prostate cancer by way of PSA screening.  The patient understands that PSA blood testing is an imperfect way to screen for prostate cancer and that elevated PSA levels in the blood may also be caused by infection, inflammation, prostatic trauma or manipulation, urological procedures, or by  benign prostatic enlargement.    The role of the digital rectal examination in prostate cancer screening was also discussed and I discussed with him that there is large interobserver variability in the findings of digital rectal examination.    Counseling:  Alcohol/drug use: discussed moderation in alcohol intake, the recommendations for healthy alcohol use, and avoidance of illicit drug use.  Dental Health: discussed importance of regular tooth brushing, flossing, and dental visits.  Injury prevention: discussed safety/seat belts, safety helmets, smoke detectors, carbon dioxide detectors, and smoking near bedding or upholstery.  Sexual health: discussed sexually transmitted diseases, partner selection, use of condoms, avoidance of unintended pregnancy, and contraceptive alternatives.  Exercise: the importance of regular exercise/physical activity was discussed. Recommend exercise 3-5 times per week for at least 30 minutes.       Depression Screening and Follow-up Plan: Patient was screened for depression during today's encounter. They screened negative with a PHQ-2 score of 0.        History of Present Illness     Adult Annual Physical:  Patient presents for annual physical.     Diet and Physical Activity:  - Diet/Nutrition: well balanced diet.  - Exercise: moderate cardiovascular exercise.    Depression Screening:  - PHQ-2 Score: 0    General Health:  - Sleep: sleeps well.  - Hearing: normal hearing bilateral ears.  - Vision: goes for regular eye exams.  - Dental: regular dental visits.    Review of Systems   Constitutional:  Negative for chills and fever.   HENT:  Negative for congestion, postnasal drip, rhinorrhea and sinus pain.    Eyes:  Negative for photophobia and visual disturbance.   Respiratory:  Negative for cough and shortness of breath.    Cardiovascular:  Negative for chest pain, palpitations and leg swelling.   Gastrointestinal:  Positive for diarrhea. Negative for abdominal pain, constipation,  nausea and vomiting.   Genitourinary:  Negative for difficulty urinating and dysuria.   Musculoskeletal:  Negative for arthralgias and myalgias.   Skin:  Negative for rash.   Neurological:  Negative for dizziness and syncope.     Pertinent Medical History     Medical History Reviewed by provider this encounter:  Tobacco  Allergies  Meds  Problems  Med Hx  Surg Hx  Fam Hx       Past Medical History   Past Medical History:   Diagnosis Date    Allergic     PCN red dots as a baby    Chondromalacia of left patellofemoral joint     Colon polyp      Past Surgical History:   Procedure Laterality Date    COLONOSCOPY  2018    Adenoma    EYE SURGERY  2005    [facility] Correction    TONSILLECTOMY       Family History   Problem Relation Age of Onset    Heart defect Mother     Stroke Mother         Mom  from stroke in  at age of 81    Hearing loss Mother         As child from scarlet fever    Anxiety disorder Mother         1 of 13 siblings raised in poverty and poor health    Stroke Father         Lost right field of vision at 80 years old and lived another 7 years fine    Coronary artery disease Father     Prostate cancer Father     COPD Father     Diabetes Maternal Grandmother     Diabetes Maternal Aunt     Diabetes Paternal Uncle      Current Outpatient Medications on File Prior to Visit   Medication Sig Dispense Refill    multivitamin (THERAGRAN) TABS Take 1 tablet by mouth daily      [DISCONTINUED] ergocalciferol (VITAMIN D2) 50,000 units Take 1 capsule (50,000 Units total) by mouth once a week for 8 doses 8 capsule 0    [DISCONTINUED] fluticasone (FLONASE) 50 mcg/act nasal spray 2 sprays into each nostril daily (Patient not taking: Reported on 10/28/2022) 16 g 0     No current facility-administered medications on file prior to visit.     Allergies   Allergen Reactions    Penicillins      Patient was told by parents       Current Outpatient Medications on File Prior to Visit   Medication Sig Dispense  "Refill    multivitamin (THERAGRAN) TABS Take 1 tablet by mouth daily      [DISCONTINUED] ergocalciferol (VITAMIN D2) 50,000 units Take 1 capsule (50,000 Units total) by mouth once a week for 8 doses 8 capsule 0    [DISCONTINUED] fluticasone (FLONASE) 50 mcg/act nasal spray 2 sprays into each nostril daily (Patient not taking: Reported on 10/28/2022) 16 g 0     No current facility-administered medications on file prior to visit.      Social History     Tobacco Use    Smoking status: Never    Smokeless tobacco: Never   Vaping Use    Vaping status: Never Used   Substance and Sexual Activity    Alcohol use: Yes     Alcohol/week: 1.0 standard drink of alcohol     Types: 1 Glasses of wine per week     Comment: wine once a month    Drug use: Never    Sexual activity: Yes     Partners: Female     Birth control/protection: None       Objective     /82 (BP Location: Left arm, Patient Position: Sitting, Cuff Size: Standard)   Pulse 80   Temp 98.6 °F (37 °C) (Tympanic)   Resp 18   Ht 5' 2\" (1.575 m)   Wt 80.1 kg (176 lb 9.6 oz)   SpO2 96%   BMI 32.30 kg/m²     Physical Exam  Constitutional:       General: He is not in acute distress.     Appearance: Normal appearance. He is not ill-appearing, toxic-appearing or diaphoretic.   HENT:      Head: Normocephalic and atraumatic.      Right Ear: Tympanic membrane and ear canal normal.      Left Ear: Tympanic membrane and ear canal normal.      Nose: Nose normal. No congestion.      Mouth/Throat:      Mouth: Mucous membranes are moist.      Pharynx: Oropharynx is clear. No oropharyngeal exudate.   Eyes:      Extraocular Movements: Extraocular movements intact.      Conjunctiva/sclera: Conjunctivae normal.      Pupils: Pupils are equal, round, and reactive to light.   Cardiovascular:      Rate and Rhythm: Normal rate and regular rhythm.      Pulses: Normal pulses.      Heart sounds: No murmur heard.  Pulmonary:      Effort: Pulmonary effort is normal.      Breath sounds: " Normal breath sounds. No wheezing, rhonchi or rales.   Abdominal:      General: Bowel sounds are normal. There is no distension.      Palpations: Abdomen is soft.      Tenderness: There is no abdominal tenderness.   Musculoskeletal:         General: No swelling or tenderness. Normal range of motion.      Cervical back: Normal range of motion and neck supple.   Skin:     General: Skin is warm and dry.      Capillary Refill: Capillary refill takes less than 2 seconds.   Neurological:      General: No focal deficit present.      Mental Status: He is alert and oriented to person, place, and time.      Cranial Nerves: No cranial nerve deficit.   Psychiatric:         Mood and Affect: Mood normal.         Behavior: Behavior normal.         Thought Content: Thought content normal.

## 2024-07-23 NOTE — ASSESSMENT & PLAN NOTE
Jass has had diarrhea for 3 weeks now ever since returning from vacation  We will do labs and stool studies  We discussed imaging if needed

## 2024-07-24 LAB
BACTERIA UR CULT: NORMAL
C COLI+JEJUNI TUF STL QL NAA+PROBE: NEGATIVE
EC STX1+STX2 GENES STL QL NAA+PROBE: NEGATIVE
SALMONELLA SP SPAO STL QL NAA+PROBE: NEGATIVE
SHIGELLA SP+EIEC IPAH STL QL NAA+PROBE: NEGATIVE

## 2024-08-07 ENCOUNTER — TELEPHONE (OUTPATIENT)
Age: 59
End: 2024-08-07

## 2024-08-07 DIAGNOSIS — R10.30 LOWER ABDOMINAL PAIN: ICD-10-CM

## 2024-08-07 DIAGNOSIS — R19.7 DIARRHEA OF PRESUMED INFECTIOUS ORIGIN: Primary | ICD-10-CM

## 2024-08-07 NOTE — TELEPHONE ENCOUNTER
Pt would like staff to be on the lookout for a fax or mail from N. They do need more information from Dr Stoll.

## 2024-08-19 ENCOUNTER — TELEPHONE (OUTPATIENT)
Age: 59
End: 2024-08-19

## 2024-08-19 NOTE — TELEPHONE ENCOUNTER
Patient had called in stating that he has his CT scan scheduled for tomorrow August 20th, and was told by central scheduling, that someone would be reaching out to get the patients barium drink.     Patient was unsure what that meant, as he thought someone would send this into the pharmacy.     Please advise back to patient of where or how this patient needs to receive his barium drink, as he does need to drink some the night before.     Did look at script, and it states the patient go and get the drink from the place he is having the CT scan completed, but patient was completely unaware.

## 2024-08-19 NOTE — TELEPHONE ENCOUNTER
Spoke with pt via telephone, advised pt to  barium at [facility] registration. Also reviewed instructions for drinking barium and fasting as written on order.

## 2024-08-20 ENCOUNTER — HOSPITAL ENCOUNTER (OUTPATIENT)
Dept: CT IMAGING | Facility: HOSPITAL | Age: 59
Discharge: HOME | End: 2024-08-20
Payer: COMMERCIAL

## 2024-08-20 DIAGNOSIS — R10.30 LOWER ABDOMINAL PAIN: ICD-10-CM

## 2024-08-20 DIAGNOSIS — R19.7 DIARRHEA OF PRESUMED INFECTIOUS ORIGIN: ICD-10-CM

## 2024-08-20 PROCEDURE — 74177 CT ABD & PELVIS W/CONTRAST: CPT

## 2024-08-20 RX ADMIN — IOHEXOL 50 ML: 350 INJECTION, SOLUTION INTRAVENOUS at 07:45

## 2024-08-22 PROBLEM — R19.7 DIARRHEA OF PRESUMED INFECTIOUS ORIGIN: Status: RESOLVED | Noted: 2024-07-23 | Resolved: 2024-08-22

## 2024-08-28 ENCOUNTER — TELEPHONE (OUTPATIENT)
Age: 59
End: 2024-08-28

## 2024-08-28 NOTE — TELEPHONE ENCOUNTER
Patient had called back in from Dr. Stoll voicemail she had left.     Patient stated he is ready to move forward and is ready for the prescription that was recommended, as well as the follow up appointment that was suggested with the gastro appointment and antibiotic     Please advise back to patient with any additional information.

## 2024-08-29 DIAGNOSIS — R19.7 DIARRHEA OF PRESUMED INFECTIOUS ORIGIN: Primary | ICD-10-CM

## 2024-08-29 DIAGNOSIS — K52.9 COLITIS: ICD-10-CM

## 2024-08-29 DIAGNOSIS — N40.0 PROSTATE HYPERTROPHY: ICD-10-CM

## 2024-08-29 RX ORDER — CIPROFLOXACIN 500 MG/1
500 TABLET, FILM COATED ORAL EVERY 12 HOURS SCHEDULED
Qty: 14 TABLET | Refills: 0 | Status: SHIPPED | OUTPATIENT
Start: 2024-08-29 | End: 2024-09-05

## 2024-09-03 ENCOUNTER — TELEPHONE (OUTPATIENT)
Age: 59
End: 2024-09-03

## 2024-09-03 ENCOUNTER — TELEPHONE (OUTPATIENT)
Dept: FAMILY MEDICINE CLINIC | Facility: CLINIC | Age: 59
End: 2024-09-03

## 2024-09-03 NOTE — TELEPHONE ENCOUNTER
Patient called to say that Dr Stoll asked him to call this week and let her know how he is feeling. He stated that he is feeling 75% better. He has 1 more dose left of his antibiotics. He asked if he would need to take another round of antibiotics or if he would need a couple more days worth.      Please advise and call patient.

## 2024-09-06 ENCOUNTER — OFFICE VISIT (OUTPATIENT)
Dept: GASTROENTEROLOGY | Facility: CLINIC | Age: 59
End: 2024-09-06
Payer: COMMERCIAL

## 2024-09-06 ENCOUNTER — APPOINTMENT (OUTPATIENT)
Dept: LAB | Facility: HOSPITAL | Age: 59
End: 2024-09-06

## 2024-09-06 VITALS
WEIGHT: 177.8 LBS | BODY MASS INDEX: 32.72 KG/M2 | HEIGHT: 62 IN | SYSTOLIC BLOOD PRESSURE: 120 MMHG | DIASTOLIC BLOOD PRESSURE: 82 MMHG

## 2024-09-06 DIAGNOSIS — R19.7 DIARRHEA OF PRESUMED INFECTIOUS ORIGIN: ICD-10-CM

## 2024-09-06 DIAGNOSIS — K52.9 COLITIS: ICD-10-CM

## 2024-09-06 PROCEDURE — 99213 OFFICE O/P EST LOW 20 MIN: CPT | Performed by: STUDENT IN AN ORGANIZED HEALTH CARE EDUCATION/TRAINING PROGRAM

## 2024-09-06 NOTE — PROGRESS NOTES
Consultation - [facility]     Jass Browne 58 y.o. male MRN: [id number]  Unit/Bed#:  Encounter: [id number]    Consults    ASSESSMENT and PLAN    1. Diarrhea of presumed infectious origin  I do suspect that he had infectious diarrhea leading to colitis.  He is fortunately demonstrating improvement after a course of ciprofloxacin.  Enteric panel was negative, CT showed rectosigmoid colitis.  We will check a CRP and fecal calprotectin to ensure no new onset inflammatory component.  We discussed keeping to a bland, low fiber diet for a while for postinfectious IBS symptoms.  As he gets back to his baseline he can begin to reinstitute fiber.  We will see him back in 8 weeks to reassess.  - Ambulatory referral to Gastroenterology    2. Colitis  As above, suspect infectious colitis.  No need for repeat antibiotics given that he is improving.  Check lab and stool studies as above.  - Ambulatory referral to Gastroenterology  - C-reactive protein; Future  - Calprotectin,Fecal; Future      Chief Complaint   Patient presents with    Diarrhea     Pt has been experiencing diarrhea since July. Pt recently finished course of antibiotics, stool is getting somewhat formed. Pt noted symptoms started after being on vacation in [place].       Physician Requesting Consult: Dr. Angela GILLETTE  Jass Browne is a 58 y.o. year old male with no significant past medical history who is presenting for chronic diarrhea.  He went to [place] in early July and developed some diarrhea after eating at a Mexican restaurant.  He treated himself supportively out of concern for a stomach virus.  He then followed up with his primary care doctor in August due to symptoms going on for 1 month.  A CT showed subacute colitis and he was started on Cipro which she completed.  His stools are now semiformed but are still not back to baseline.  At the time he was having diarrhea he states he was having  mucousy/watery stools every 2-3 hours.  He never had any blood per rectum.    Patient stated no reported fevers, chills, nausea, vomiting, hematemesis, odynophagia, dysphagia, heartburn, regurgitation, SOB, CP, abdominal pain, changes in bowel movement with diarrhea or constipation, melena, hematochezia, or unintentional weight loss.  Colonoscopy from  personally reviewed, 1 small polyp removed, otherwise unremarkable.  Personally reviewed CT in the office with the patient and he has evidence of rectosigmoid colitis.  Enteric panel as ordered by his primary care doctor was negative.    GI History:  Prior Colonoscopy: No prior colonoscopy  Prior EGD:  No prior EGD  Family hx:  No reported first degree relatives with colorectal cancer  Surgical hx: No prior abdominal surgeries  Blood thinners: Denies antiplatelet or anticoagulation use  NSAID use: Denies regular NSAID use  DM meds: None  Social Hx: No regular ETOH, smoking, or drug use.          Historical Information   Past Medical History:   Diagnosis Date    Allergic     PCN red dots as a baby    Chondromalacia of left patellofemoral joint     Colon polyp      Past Surgical History:   Procedure Laterality Date    COLONOSCOPY  2018    Adenoma    EYE SURGERY      [employer]    TONSILLECTOMY       Social History   Social History     Substance and Sexual Activity   Alcohol Use Yes    Alcohol/week: 1.0 standard drink of alcohol    Types: 1 Glasses of wine per week    Comment: wine once a month     Social History     Substance and Sexual Activity   Drug Use Never     Social History     Tobacco Use   Smoking Status Never    Passive exposure: Never   Smokeless Tobacco Never     Family History   Problem Relation Age of Onset    Heart defect Mother     Stroke Mother         Mom  from sudden stroke in  at age of 81    Hearing loss Mother         As child from scarlet fever    Anxiety disorder Mother         1 of 13 siblings raised in poverty and  "poor health    Stroke Father         Lost right field of vision at 80 years old and lived another 7 years fine    Coronary artery disease Father     Prostate cancer Father     COPD Father     Cancer Father         Prostrate cancer successfully treated approximately 15 years priot to death and  cancer free.    Diabetes Maternal Grandmother     Diabetes Maternal Aunt     Diabetes Paternal Uncle     Colon cancer Neg Hx        Meds/Allergies     No current facility-administered medications for this visit.     Not in a hospital admission.    Allergies   Allergen Reactions    Penicillins      Patient was told by parents        PHYSICAL EXAM    Visit Vitals  /82   Ht 5' 2\" (1.575 m)   Wt 80.6 kg (177 lb 12.8 oz)   BMI 32.52 kg/m²   Smoking Status Never   BSA 1.82 m²     Body mass index is 32.52 kg/m².  General Appearance: NAD, cooperative, alert  Eyes: Anicteric, EOMI  ENT: Normocephalic, atraumatic, normal mucosa  Neck: symmetrical, trachea midline  Lungs: clear to auscultation, non-labored respirations on room air, no wheezing  CV: S1 S2+ radial pulses bilaterally  GI:  Soft, non-tender, non-distended; normal bowel sounds; no masses, no organomegaly   Rectal: Deferred  Musculoskeletal: No gross deformities or pitting edema  Skin:  No jaundice, no rashes  Neurologic: awake, alert, oriented, no gross deficits    Lab Results   Component Value Date    CALCIUM 9.0 2024    K 4.6 2024    CO2 25 2024     2024    BUN 12 2024    CREATININE 0.73 2024    CREATININE 0.83 2023    CREATININE 0.94 2023     Lab Results   Component Value Date    WBC 7.04 2024    WBC 9.13 2023    WBC 7.04 2022    HGB 16.0 2024    HGB 15.3 2023    HGB 16.5 2022    MCV 91 2024     2024     2023     2022     Lab Results   Component Value Date    ALT 18 2024    ALT 19 2023    ALT 44 2023    AST 18 " "07/23/2024    AST 13 09/26/2023    AST 17 01/18/2023    ALKPHOS 66 07/23/2024    ALKPHOS 73 09/26/2023    ALKPHOS 98 01/18/2023    TBILI 0.81 07/23/2024    TBILI 0.54 09/26/2023    TBILI 0.80 01/18/2023     No results found for: \"AMYLASE\"  Lab Results   Component Value Date    LIPASE 23 07/23/2024     No results found for: \"IRON\", \"TIBC\", \"FERRITIN\"  No results found for: \"INR\"    CT abdomen pelvis w contrast    Result Date: 8/20/2024  Narrative: CT ABDOMEN AND PELVIS WITH IV CONTRAST INDICATION:   Diarrhea, unspecified. Lower abdominal pain, unspecified.  COMPARISON:  None. TECHNIQUE:  CT examination of the abdomen and pelvis was performed. Multiplanar 2D reformatted images were created from the source data. This examination, like all CT scans performed in the [facility], was performed utilizing techniques to minimize radiation dose exposure, including the use of iterative reconstruction and automated exposure control. Radiation dose length product (DLP) for this visit: IV Contrast:  barium (READI-CAT 2) suspension 900 mL - iohexol (OMNIPAQUE) 350 MG/ML injection (MULTI-DOSE) 50 mL Enteric Contrast:  Enteric contrast was administered. FINDINGS: ABDOMEN LOWER CHEST: No clinically significant abnormality in the visualized lower chest. LIVER/BILIARY TREE: Unremarkable. GALLBLADDER: No calcified gallstones. No pericholecystic inflammatory change. SPLEEN: Unremarkable. PANCREAS: Unremarkable. ADRENAL GLANDS: Unremarkable. KIDNEYS/URETERS: Unremarkable. No hydronephrosis. STOMACH AND BOWEL: Colonic diverticulosis without findings of acute diverticulitis. Mild wall thickening involving the descending colon down to the rectum without significant pericolonic inflammation. APPENDIX: No findings to suggest appendicitis. ABDOMINOPELVIC CAVITY: No ascites. No pneumoperitoneum. No lymphadenopathy. VESSELS: Atherosclerosis without abdominal aortic aneurysm. PELVIS REPRODUCTIVE ORGANS: Enlarged prostate. URINARY " BLADDER: Diffuse bladder wall thickening without focal lesion.. ABDOMINAL WALL/INGUINAL REGIONS: Small fat-containing umbilical hernia. Small fat-containing left inguinal hernia BONES: No acute fracture or suspicious osseous lesion.     Impression: 1. Findings suggestive of subacute colitis as demonstrated by mild wall thickening involving the descending colon down to the rectum without surrounding inflammatory changes. Scattered colonic diverticulosis without diverticulitis. Normal appendix. Normal terminal ileum. 2. Prostate hypertrophy. Mild bladder wall thickening sequela chronic urinary retention 3. Small fat-containing umbilical and left inguinal hernias Electronically signed: 08/20/2024 11:24 PM Jaime Knapp MD      Imaging Studies: I have personally reviewed pertinent reports.      Pathology, and Other Studies: I have personally reviewed pertinent reports.      REVIEW OF SYSTEMS    CONSTITUTIONAL: negative unless stated in HPI  GASTROINTESTINAL: As noted in the HPI

## 2024-09-12 ENCOUNTER — APPOINTMENT (OUTPATIENT)
Dept: LAB | Facility: HOSPITAL | Age: 59
End: 2024-09-12
Payer: COMMERCIAL

## 2024-09-12 DIAGNOSIS — K52.9 COLITIS: ICD-10-CM

## 2024-09-12 LAB — CRP SERPL QL: <1 MG/L

## 2024-09-12 PROCEDURE — 86140 C-REACTIVE PROTEIN: CPT

## 2024-09-12 PROCEDURE — 36415 COLL VENOUS BLD VENIPUNCTURE: CPT

## 2024-09-12 PROCEDURE — 83993 ASSAY FOR CALPROTECTIN FECAL: CPT

## 2024-09-16 LAB — CALPROTECTIN STL-MCNC: 13.7 ΜG/G

## 2024-10-23 ENCOUNTER — OFFICE VISIT (OUTPATIENT)
Dept: GASTROENTEROLOGY | Facility: CLINIC | Age: 59
End: 2024-10-23
Payer: COMMERCIAL

## 2024-10-23 ENCOUNTER — TELEPHONE (OUTPATIENT)
Dept: GASTROENTEROLOGY | Facility: CLINIC | Age: 59
End: 2024-10-23

## 2024-10-23 VITALS
HEIGHT: 62 IN | BODY MASS INDEX: 32.76 KG/M2 | SYSTOLIC BLOOD PRESSURE: 138 MMHG | DIASTOLIC BLOOD PRESSURE: 90 MMHG | WEIGHT: 178 LBS

## 2024-10-23 DIAGNOSIS — R10.84 GENERALIZED ABDOMINAL PAIN: Primary | ICD-10-CM

## 2024-10-23 DIAGNOSIS — R19.4 CHANGE IN BOWEL HABIT: ICD-10-CM

## 2024-10-23 DIAGNOSIS — R93.3 ABNORMAL CT SCAN, COLON: ICD-10-CM

## 2024-10-23 PROCEDURE — 99214 OFFICE O/P EST MOD 30 MIN: CPT | Performed by: STUDENT IN AN ORGANIZED HEALTH CARE EDUCATION/TRAINING PROGRAM

## 2024-10-23 NOTE — TELEPHONE ENCOUNTER
Scheduled date of combo (as of today): 11/5/2024  Physician performing combo: ND  Location of combo: [facility]  Bowel prep reviewed with patient: Miralax  Instructions reviewed with patient by: GIOVANNA  Clearances: N

## 2024-10-23 NOTE — H&P (VIEW-ONLY)
"[facility] - Outpatient Follow-up Note  Jass Browne 58 y.o. male MRN: [id number]  Encounter: [id number]    ASSESSMENT AND PLAN:      1. Generalized abdominal pain  I am glad that he is having improvement but his symptoms have been going on for more than 3 months and he did have a CT showing colitis.  He is not having some upper abdominal pain.  We mutually agreed to proceed with EGD and colonoscopy to rule out peptic ulcer disease, gastritis, duodenitis and any causes of his colitis including inflammatory bowel disease.  Procedure, preparation risk-benefit discussed in detail.  - Colonoscopy; Future  - EGD; Future    2. Change in bowel habit  Possible postinfectious IBS, however he did have colitis on imaging over the summer and still has symptoms even after 3 months.  We will proceed with EGD/colonoscopy as above   For biopsy.  - Colonoscopy; Future  - EGD; Future    3. Abnormal CT scan, colon  As above, EGD colonoscopy  - Colonoscopy; Future         Follow up appointment: For procedures    _______________________      Chief Complaint   Patient presents with    Diarrhea     Still has not had hard BM, is having improvement though       HPI:   Jass is a very pleasant 58 year old male presenting for follow up of change in his bowels and generalized abdominal pain.  He was last seen in September for colitis presumably infectious.  Symptoms have been gradually resolving but his bowels are still not back to normal.  He is having mostly loose stool.  He will feel \"heat \"in his lower colon and still has intermittent discomfort in his upper and lower abdomen.  No GI bleeding, fevers, chills, nausea or vomiting.    Historical Information   Past Medical History:   Diagnosis Date    Allergic 1966    PCN red dots as a baby    Chondromalacia of left patellofemoral joint     Colon polyp      Past Surgical History:   Procedure Laterality Date    COLONOSCOPY  2018    Adenoma    " "COLONOSCOPY  2022    EYE SURGERY  2005    [employer]    TONSILLECTOMY       Social History     Substance and Sexual Activity   Alcohol Use Yes    Alcohol/week: 1.0 standard drink of alcohol    Types: 1 Glasses of wine per week    Comment: wine once a month     Social History     Substance and Sexual Activity   Drug Use Never     Social History     Tobacco Use   Smoking Status Never    Passive exposure: Never   Smokeless Tobacco Never     Family History   Problem Relation Age of Onset    Heart defect Mother     Stroke Mother         Mom  from sudden stroke in  at age of 81    Hearing loss Mother         As child from scarlet fever    Anxiety disorder Mother         1 of 13 siblings raised in poverty and poor health    Stroke Father         Lost right field of vision at 80 years old and lived another 7 years fine    Coronary artery disease Father     Prostate cancer Father     COPD Father     Cancer Father         Prostrate cancer successfully treated approximately 15 years priot to death and  cancer free.    Diabetes Maternal Grandmother     Diabetes Maternal Aunt     Diabetes Paternal Uncle     Colon cancer Neg Hx          Current Outpatient Medications:     multivitamin (THERAGRAN) TABS  Allergies   Allergen Reactions    Penicillins      Patient was told by parents      Reviewed medications and allergies and updated as indicated    PHYSICAL EXAM:    Blood pressure 138/90, height 5' 2\" (1.575 m), weight 80.7 kg (178 lb). Body mass index is 32.56 kg/m².  General Appearance: NAD, cooperative, alert  Eyes: Anicteric  GI:  Soft, non-tender, non-distended; normal bowel sounds; no masses, no organomegaly   Rectal: Deferred  Musculoskeletal: No edema.  Skin:  No jaundice    Lab Results:   Lab Results   Component Value Date    WBC 7.04 2024    WBC 9.13 2023    WBC 7.04 2022    HGB 16.0 2024    HGB 15.3 2023    HGB 16.5 2022    MCV 91 2024     " "07/23/2024     09/26/2023     01/27/2022     Lab Results   Component Value Date    K 4.6 07/23/2024     07/23/2024    CO2 25 07/23/2024    BUN 12 07/23/2024    CREATININE 0.73 07/23/2024    GLUF 102 (H) 07/23/2024    CALCIUM 9.0 07/23/2024    AST 18 07/23/2024    AST 13 09/26/2023    AST 17 01/18/2023    ALT 18 07/23/2024    ALT 19 09/26/2023    ALT 44 01/18/2023    ALKPHOS 66 07/23/2024    ALKPHOS 73 09/26/2023    ALKPHOS 98 01/18/2023    EGFR 102 07/23/2024     No results found for: \"IRON\", \"TIBC\", \"FERRITIN\"  Lab Results   Component Value Date    LIPASE 23 07/23/2024       Radiology Results:   No results found.  "

## 2024-10-23 NOTE — PROGRESS NOTES
"[facility] - Outpatient Follow-up Note  Jass Browne 58 y.o. male MRN: [id number]  Encounter: [id number]    ASSESSMENT AND PLAN:      1. Generalized abdominal pain  I am glad that he is having improvement but his symptoms have been going on for more than 3 months and he did have a CT showing colitis.  He is not having some upper abdominal pain.  We mutually agreed to proceed with EGD and colonoscopy to rule out peptic ulcer disease, gastritis, duodenitis and any causes of his colitis including inflammatory bowel disease.  Procedure, preparation risk-benefit discussed in detail.  - Colonoscopy; Future  - EGD; Future    2. Change in bowel habit  Possible postinfectious IBS, however he did have colitis on imaging over the summer and still has symptoms even after 3 months.  We will proceed with EGD/colonoscopy as above   For biopsy.  - Colonoscopy; Future  - EGD; Future    3. Abnormal CT scan, colon  As above, EGD colonoscopy  - Colonoscopy; Future         Follow up appointment: For procedures    _______________________      Chief Complaint   Patient presents with    Diarrhea     Still has not had hard BM, is having improvement though       HPI:   Jass is a very pleasant 58 year old male presenting for follow up of change in his bowels and generalized abdominal pain.  He was last seen in September for colitis presumably infectious.  Symptoms have been gradually resolving but his bowels are still not back to normal.  He is having mostly loose stool.  He will feel \"heat \"in his lower colon and still has intermittent discomfort in his upper and lower abdomen.  No GI bleeding, fevers, chills, nausea or vomiting.    Historical Information   Past Medical History:   Diagnosis Date    Allergic 1966    PCN red dots as a baby    Chondromalacia of left patellofemoral joint     Colon polyp      Past Surgical History:   Procedure Laterality Date    COLONOSCOPY  2018    Adenoma    " "COLONOSCOPY  2022    EYE SURGERY  2005    [employer]    TONSILLECTOMY       Social History     Substance and Sexual Activity   Alcohol Use Yes    Alcohol/week: 1.0 standard drink of alcohol    Types: 1 Glasses of wine per week    Comment: wine once a month     Social History     Substance and Sexual Activity   Drug Use Never     Social History     Tobacco Use   Smoking Status Never    Passive exposure: Never   Smokeless Tobacco Never     Family History   Problem Relation Age of Onset    Heart defect Mother     Stroke Mother         Mom  from sudden stroke in  at age of 81    Hearing loss Mother         As child from scarlet fever    Anxiety disorder Mother         1 of 13 siblings raised in poverty and poor health    Stroke Father         Lost right field of vision at 80 years old and lived another 7 years fine    Coronary artery disease Father     Prostate cancer Father     COPD Father     Cancer Father         Prostrate cancer successfully treated approximately 15 years priot to death and  cancer free.    Diabetes Maternal Grandmother     Diabetes Maternal Aunt     Diabetes Paternal Uncle     Colon cancer Neg Hx          Current Outpatient Medications:     multivitamin (THERAGRAN) TABS  Allergies   Allergen Reactions    Penicillins      Patient was told by parents      Reviewed medications and allergies and updated as indicated    PHYSICAL EXAM:    Blood pressure 138/90, height 5' 2\" (1.575 m), weight 80.7 kg (178 lb). Body mass index is 32.56 kg/m².  General Appearance: NAD, cooperative, alert  Eyes: Anicteric  GI:  Soft, non-tender, non-distended; normal bowel sounds; no masses, no organomegaly   Rectal: Deferred  Musculoskeletal: No edema.  Skin:  No jaundice    Lab Results:   Lab Results   Component Value Date    WBC 7.04 2024    WBC 9.13 2023    WBC 7.04 2022    HGB 16.0 2024    HGB 15.3 2023    HGB 16.5 2022    MCV 91 2024     " "07/23/2024     09/26/2023     01/27/2022     Lab Results   Component Value Date    K 4.6 07/23/2024     07/23/2024    CO2 25 07/23/2024    BUN 12 07/23/2024    CREATININE 0.73 07/23/2024    GLUF 102 (H) 07/23/2024    CALCIUM 9.0 07/23/2024    AST 18 07/23/2024    AST 13 09/26/2023    AST 17 01/18/2023    ALT 18 07/23/2024    ALT 19 09/26/2023    ALT 44 01/18/2023    ALKPHOS 66 07/23/2024    ALKPHOS 73 09/26/2023    ALKPHOS 98 01/18/2023    EGFR 102 07/23/2024     No results found for: \"IRON\", \"TIBC\", \"FERRITIN\"  Lab Results   Component Value Date    LIPASE 23 07/23/2024       Radiology Results:   No results found.  "

## 2024-10-29 ENCOUNTER — ANESTHESIA EVENT (OUTPATIENT)
Dept: ANESTHESIOLOGY | Facility: AMBULATORY SURGERY CENTER | Age: 59
End: 2024-10-29

## 2024-10-29 ENCOUNTER — ANESTHESIA (OUTPATIENT)
Dept: ANESTHESIOLOGY | Facility: AMBULATORY SURGERY CENTER | Age: 59
End: 2024-10-29

## 2024-11-05 ENCOUNTER — TELEPHONE (OUTPATIENT)
Dept: GASTROENTEROLOGY | Facility: CLINIC | Age: 59
End: 2024-11-05

## 2024-11-05 ENCOUNTER — HOSPITAL ENCOUNTER (OUTPATIENT)
Dept: GASTROENTEROLOGY | Facility: AMBULATORY SURGERY CENTER | Age: 59
Discharge: HOME | End: 2024-11-05
Attending: STUDENT IN AN ORGANIZED HEALTH CARE EDUCATION/TRAINING PROGRAM
Payer: COMMERCIAL

## 2024-11-05 ENCOUNTER — ANESTHESIA (OUTPATIENT)
Dept: GASTROENTEROLOGY | Facility: AMBULATORY SURGERY CENTER | Age: 59
End: 2024-11-05

## 2024-11-05 ENCOUNTER — ANESTHESIA EVENT (OUTPATIENT)
Dept: GASTROENTEROLOGY | Facility: AMBULATORY SURGERY CENTER | Age: 59
End: 2024-11-05

## 2024-11-05 VITALS
WEIGHT: 175 LBS | BODY MASS INDEX: 29.88 KG/M2 | RESPIRATION RATE: 22 BRPM | SYSTOLIC BLOOD PRESSURE: 131 MMHG | HEART RATE: 97 BPM | OXYGEN SATURATION: 96 % | DIASTOLIC BLOOD PRESSURE: 66 MMHG | HEIGHT: 64 IN | TEMPERATURE: 96.8 F

## 2024-11-05 DIAGNOSIS — K22.10 EROSIVE ESOPHAGITIS: ICD-10-CM

## 2024-11-05 DIAGNOSIS — R10.84 GENERALIZED ABDOMINAL PAIN: ICD-10-CM

## 2024-11-05 DIAGNOSIS — R19.4 CHANGE IN BOWEL HABIT: ICD-10-CM

## 2024-11-05 DIAGNOSIS — R93.3 ABNORMAL CT SCAN, COLON: ICD-10-CM

## 2024-11-05 DIAGNOSIS — K29.01 ACUTE SUPERFICIAL GASTRITIS WITH HEMORRHAGE: Primary | ICD-10-CM

## 2024-11-05 PROCEDURE — 43239 EGD BIOPSY SINGLE/MULTIPLE: CPT | Performed by: STUDENT IN AN ORGANIZED HEALTH CARE EDUCATION/TRAINING PROGRAM

## 2024-11-05 PROCEDURE — 45380 COLONOSCOPY AND BIOPSY: CPT | Performed by: STUDENT IN AN ORGANIZED HEALTH CARE EDUCATION/TRAINING PROGRAM

## 2024-11-05 PROCEDURE — 88305 TISSUE EXAM BY PATHOLOGIST: CPT | Performed by: SPECIALIST

## 2024-11-05 RX ORDER — LIDOCAINE HYDROCHLORIDE 20 MG/ML
INJECTION, SOLUTION EPIDURAL; INFILTRATION; INTRACAUDAL; PERINEURAL AS NEEDED
Status: DISCONTINUED | OUTPATIENT
Start: 2024-11-05 | End: 2024-11-05

## 2024-11-05 RX ORDER — ESOMEPRAZOLE MAGNESIUM 40 MG/1
40 CAPSULE, DELAYED RELEASE ORAL DAILY
Qty: 60 CAPSULE | Refills: 0 | Status: SHIPPED | OUTPATIENT
Start: 2024-11-05 | End: 2025-01-04

## 2024-11-05 RX ORDER — SODIUM CHLORIDE, SODIUM LACTATE, POTASSIUM CHLORIDE, CALCIUM CHLORIDE 600; 310; 30; 20 MG/100ML; MG/100ML; MG/100ML; MG/100ML
50 INJECTION, SOLUTION INTRAVENOUS CONTINUOUS
Status: DISCONTINUED | OUTPATIENT
Start: 2024-11-05 | End: 2024-11-09 | Stop reason: HOSPADM

## 2024-11-05 RX ORDER — PROPOFOL 10 MG/ML
INJECTION, EMULSION INTRAVENOUS AS NEEDED
Status: DISCONTINUED | OUTPATIENT
Start: 2024-11-05 | End: 2024-11-05

## 2024-11-05 RX ADMIN — PROPOFOL 50 MG: 10 INJECTION, EMULSION INTRAVENOUS at 08:01

## 2024-11-05 RX ADMIN — SODIUM CHLORIDE, SODIUM LACTATE, POTASSIUM CHLORIDE, CALCIUM CHLORIDE 50 ML/HR: 600; 310; 30; 20 INJECTION, SOLUTION INTRAVENOUS at 07:35

## 2024-11-05 RX ADMIN — PROPOFOL 50 MG: 10 INJECTION, EMULSION INTRAVENOUS at 07:58

## 2024-11-05 RX ADMIN — PROPOFOL 50 MG: 10 INJECTION, EMULSION INTRAVENOUS at 08:06

## 2024-11-05 RX ADMIN — PROPOFOL 50 MG: 10 INJECTION, EMULSION INTRAVENOUS at 08:13

## 2024-11-05 RX ADMIN — LIDOCAINE HYDROCHLORIDE 100 MG: 20 INJECTION, SOLUTION EPIDURAL; INFILTRATION; INTRACAUDAL; PERINEURAL at 07:55

## 2024-11-05 RX ADMIN — PROPOFOL 150 MG: 10 INJECTION, EMULSION INTRAVENOUS at 07:55

## 2024-11-05 NOTE — ANESTHESIA PREPROCEDURE EVALUATION
Procedure:  COLONOSCOPY  EGD    Relevant Problems   ANESTHESIA (within normal limits)      PULMONARY (within normal limits)   (-) Sleep apnea   (-) Smoking   (-) URI (upper respiratory infection)    BMI 30    Physical Exam    Airway  Comment: Redundant tissue  Mallampati score: II  TM Distance: >3 FB  Neck ROM: full     Dental   No notable dental hx     Cardiovascular      Pulmonary      Other Findings      Lab Results   Component Value Date    WBC 7.04 07/23/2024    HGB 16.0 07/23/2024     07/23/2024     Lab Results   Component Value Date    SODIUM 141 07/23/2024    K 4.6 07/23/2024    BUN 12 07/23/2024    CREATININE 0.73 07/23/2024    EGFR 102 07/23/2024     Lab Results   Component Value Date    HGBA1C 5.4 01/18/2023     Anesthesia Plan  ASA Score- 2     Anesthesia Type- IV sedation with anesthesia with ASA Monitors.         Additional Monitors:     Airway Plan:            Plan Factors-Exercise tolerance (METS): >4 METS.    Chart reviewed.   Existing labs reviewed. Patient summary reviewed.    Patient is not a current smoker.              Induction- intravenous.    Postoperative Plan-         Informed Consent- Anesthetic plan and risks discussed with patient.  I personally reviewed this patient with the CRNA. Discussed and agreed on the Anesthesia Plan with the CRNA..

## 2024-11-05 NOTE — TELEPHONE ENCOUNTER
PA for esomeprazole 40 mg   SUBMITTED to [employer]    via    []CMM-KEY:   [x]Yonatanripts-Case ID #     [id number]    Payer: [employer]   Phone: [phone]     []Availity-Auth ID # NDC #   []Faxed to plan   []Other website   []Phone call Case ID #     Office notes sent, clinical questions answered. Awaiting determination    Turnaround time for your insurance to make a decision on your Prior Authorization can take 7-21 business days.

## 2024-11-05 NOTE — INTERVAL H&P NOTE
H&P reviewed. After examining the patient I find no changes in the patients condition since the H&P had been written.    Vitals:    11/05/24 0727   BP: 127/85   Pulse: 92   Resp: 15   Temp: (!) 96.8 °F (36 °C)   SpO2: 95%

## 2024-11-05 NOTE — ANESTHESIA POSTPROCEDURE EVALUATION
Post-Op Assessment Note    CV Status:  Stable  Pain Score: 0    Pain management: adequate       Mental Status:  Alert and awake   Hydration Status:  Euvolemic and stable   PONV Controlled:  None   Airway Patency:  Patent     Post Op Vitals Reviewed: Yes    No anethesia notable event occurred.    Staff: CRNA       Last Filed PACU Vitals:  Vitals Value Taken Time   Temp     Pulse 85 11/05/24 0823   /72 11/05/24 0823   Resp 20 11/05/24 0823   SpO2 100 % 11/05/24 0823       Modified Marisa:  Activity: 2 (11/5/2024  7:25 AM)  Respiration: 2 (11/5/2024  7:25 AM)  Circulation: 2 (11/5/2024  7:25 AM)  Consciousness: 2 (11/5/2024  7:25 AM)  Oxygen Saturation: 2 (11/5/2024  7:25 AM)  Modified Marisa Score: 10 (11/5/2024  7:25 AM)

## 2024-11-06 NOTE — TELEPHONE ENCOUNTER
PA for esomeprazole APPROVED     Date(s) approved Authorized from November 5, 2024 to November 5, 2026        Case #[id number]     Patient advised by          []CareXtendhart Message  [x]Phone call Voiced understanding  []LMOM  []L/M to call office as no active Communication consent on file  []Unable to leave detailed message as VM not approved on Communication consent       Pharmacy advised by    [x]Fax  []Phone call    Approval letter scanned into [employer] Yes

## 2024-11-08 PROCEDURE — 88305 TISSUE EXAM BY PATHOLOGIST: CPT | Performed by: SPECIALIST

## 2024-11-14 ENCOUNTER — RESULTS FOLLOW-UP (OUTPATIENT)
Dept: GASTROENTEROLOGY | Facility: CLINIC | Age: 59
End: 2024-11-14

## 2024-11-15 DIAGNOSIS — K22.10 EROSIVE ESOPHAGITIS: Primary | ICD-10-CM

## 2024-11-15 RX ORDER — FAMOTIDINE 40 MG/1
40 TABLET, FILM COATED ORAL DAILY
Qty: 60 TABLET | Refills: 0 | Status: SHIPPED | OUTPATIENT
Start: 2024-11-15 | End: 2025-01-14

## 2024-11-21 ENCOUNTER — TELEPHONE (OUTPATIENT)
Age: 59
End: 2024-11-21

## 2024-11-21 NOTE — TELEPHONE ENCOUNTER
Patient called the wrong dept. Patient was looking to speak with jayme from Dr. Angel office.   31-May-2017

## 2025-01-10 DIAGNOSIS — K22.10 EROSIVE ESOPHAGITIS: ICD-10-CM

## 2025-01-10 RX ORDER — FAMOTIDINE 40 MG/1
40 TABLET, FILM COATED ORAL DAILY
Qty: 90 TABLET | Refills: 1 | Status: SHIPPED | OUTPATIENT
Start: 2025-01-10

## 2025-01-20 DIAGNOSIS — K52.9 CHRONIC DIARRHEA: Primary | ICD-10-CM

## 2025-02-03 ENCOUNTER — APPOINTMENT (OUTPATIENT)
Dept: LAB | Facility: HOSPITAL | Age: 60
End: 2025-02-03
Payer: COMMERCIAL

## 2025-02-03 DIAGNOSIS — K52.9 CHRONIC DIARRHEA: ICD-10-CM

## 2025-02-03 LAB — IGA SERPL-MCNC: 189 MG/DL (ref 66–433)

## 2025-02-03 PROCEDURE — 86364 TISS TRNSGLTMNASE EA IG CLAS: CPT

## 2025-02-03 PROCEDURE — 36415 COLL VENOUS BLD VENIPUNCTURE: CPT

## 2025-02-03 PROCEDURE — 82784 ASSAY IGA/IGD/IGG/IGM EACH: CPT

## 2025-02-04 ENCOUNTER — RESULTS FOLLOW-UP (OUTPATIENT)
Dept: GASTROENTEROLOGY | Facility: CLINIC | Age: 60
End: 2025-02-04

## 2025-02-04 ENCOUNTER — APPOINTMENT (OUTPATIENT)
Dept: LAB | Facility: HOSPITAL | Age: 60
End: 2025-02-04
Payer: COMMERCIAL

## 2025-02-04 DIAGNOSIS — K52.9 CHRONIC DIARRHEA: ICD-10-CM

## 2025-02-04 LAB — TTG IGA SER IA-ACNC: 0.5 U/ML (ref ?–10)

## 2025-02-04 PROCEDURE — 87329 GIARDIA AG IA: CPT

## 2025-02-04 PROCEDURE — 87209 SMEAR COMPLEX STAIN: CPT

## 2025-02-04 PROCEDURE — 82653 EL-1 FECAL QUANTITATIVE: CPT

## 2025-02-04 PROCEDURE — 82705 FATS/LIPIDS FECES QUAL: CPT

## 2025-02-04 PROCEDURE — 87177 OVA AND PARASITES SMEARS: CPT

## 2025-02-05 LAB
ELASTASE PANC STL-MCNT: >800 UG/G
G LAMBLIA AG STL QL IA: NEGATIVE

## 2025-02-07 ENCOUNTER — RESULTS FOLLOW-UP (OUTPATIENT)
Dept: GASTROENTEROLOGY | Facility: CLINIC | Age: 60
End: 2025-02-07

## 2025-02-07 LAB
FAT STL QL: NORMAL
NEUTRAL FAT STL QL: NORMAL

## 2025-02-25 DIAGNOSIS — K52.9 CHRONIC DIARRHEA: Primary | ICD-10-CM

## 2025-02-25 RX ORDER — CHOLESTYRAMINE 4 G/9G
1 POWDER, FOR SUSPENSION ORAL 2 TIMES DAILY
Qty: 60 PACKET | Refills: 1 | Status: SHIPPED | OUTPATIENT
Start: 2025-02-25 | End: 2025-04-26

## 2025-04-10 NOTE — PROGRESS NOTES
Name: Jass Browne      : 1965      MRN: [id number]  Encounter Provider: Gio Mistry MD  Encounter Date: 2025   Encounter department: [facility]  :  Assessment & Plan  Prostate hypertrophy  AUA SS low   PVR 8 mL  Normal PSA  Follow up PRN  Can continue prostate cancer screening until age 70, psa every year or two    Orders:    Ambulatory Referral to Urology    POCT Measure PVR              History of Present Illness   Jass Browne is a 59 y.o. male who presents for BPH/LUTS  No symptoms or bother  PSA 2.63, normal  PVR 8 mL, emptying well    Works at the [employer] for [employer]  Spends lots of time with family for fun    The following portions of the patient's history were reviewed and updated as appropriate: allergies, current medications, past family history, past medical history, past social history, past surgical history and problem list.      AUA SYMPTOM SCORE      Flowsheet Row Most Recent Value   AUA SYMPTOM SCORE    How often have you had a sensation of not emptying your bladder completely after you finished urinating? 0 (P)     How often have you had to urinate again less than two hours after you finished urinating? 1 (P)     How often have you found you stopped and started again several times when you urinate? 0 (P)     How often have you found it difficult to postpone urination? 1 (P)     How often have you had a weak urinary stream? 1 (P)     How often have you had to push or strain to begin urination? 0 (P)     How many times did you most typically get up to urinate from the time you went to bed at night until the time you got up in the morning? 1 (P)     Quality of Life: If you were to spend the rest of your life with your urinary condition just the way it is now, how would you feel about that? 1 (P)     AUA SYMPTOM SCORE 4 (P)            The following portions of the patient's history were reviewed and updated as appropriate:  allergies, current medications, past family history, past medical history, past social history, past surgical history and problem list.    Referred by Angela Stoll DO. Today's note sent to the referring doctor.        Review of Systems   Constitutional: Negative.    HENT: Negative.     Eyes: Negative.    Respiratory: Negative.     Cardiovascular: Negative.    Gastrointestinal: Negative.    Endocrine: Negative.    Genitourinary: Negative.    Musculoskeletal: Negative.    Skin: Negative.    Allergic/Immunologic: Negative.    Neurological: Negative.    Hematological: Negative.    Psychiatric/Behavioral: Negative.            Objective   There were no vitals taken for this visit.    Physical Exam  Vitals reviewed.   Constitutional:       General: He is not in acute distress.     Appearance: Normal appearance. He is well-developed. He is not ill-appearing, toxic-appearing or diaphoretic.   HENT:      Head: Normocephalic and atraumatic.      Nose: Nose normal.      Mouth/Throat:      Mouth: Mucous membranes are moist.   Eyes:      General: No scleral icterus.        Right eye: No discharge.         Left eye: No discharge.   Neck:      Thyroid: No thyromegaly.      Trachea: No tracheal deviation.   Pulmonary:      Effort: Pulmonary effort is normal.   Chest:      Chest wall: No tenderness.   Abdominal:      General: There is no distension.      Palpations: There is no mass.      Tenderness: There is no abdominal tenderness. There is no guarding.      Hernia: No hernia is present.   Genitourinary:     Comments: Prostate 30 grams, smooth no nodules  Musculoskeletal:         General: No deformity or signs of injury. Normal range of motion.      Cervical back: Normal range of motion and neck supple.   Lymphadenopathy:      Cervical: No cervical adenopathy.   Skin:     General: Skin is dry.      Coloration: Skin is not jaundiced or pale.      Findings: No erythema.   Neurological:      Mental Status: He is alert and  oriented to person, place, and time.      Cranial Nerves: No cranial nerve deficit.      Motor: No abnormal muscle tone.      Coordination: Coordination normal.   Psychiatric:         Mood and Affect: Mood normal.         Behavior: Behavior normal.         Thought Content: Thought content normal.         Judgment: Judgment normal.          Results   Lab Results   Component Value Date    PSA 2.631 07/23/2024    PSA 2.5 01/18/2023    PSA 2.0 01/27/2022     Lab Results   Component Value Date    CALCIUM 9.0 07/23/2024    K 4.6 07/23/2024    CO2 25 07/23/2024     07/23/2024    BUN 12 07/23/2024    CREATININE 0.73 07/23/2024     Lab Results   Component Value Date    WBC 7.04 07/23/2024    HGB 16.0 07/23/2024    HCT 44.8 07/23/2024    MCV 91 07/23/2024     07/23/2024       Office Urine Dip  No results found for this or any previous visit (from the past hour).

## 2025-04-11 ENCOUNTER — OFFICE VISIT (OUTPATIENT)
Dept: UROLOGY | Facility: HOSPITAL | Age: 60
End: 2025-04-11
Payer: COMMERCIAL

## 2025-04-11 VITALS
SYSTOLIC BLOOD PRESSURE: 124 MMHG | WEIGHT: 184.6 LBS | DIASTOLIC BLOOD PRESSURE: 78 MMHG | BODY MASS INDEX: 31.51 KG/M2 | HEART RATE: 89 BPM | HEIGHT: 64 IN | OXYGEN SATURATION: 99 %

## 2025-04-11 DIAGNOSIS — N40.0 PROSTATE HYPERTROPHY: Primary | ICD-10-CM

## 2025-04-11 LAB — POST-VOID RESIDUAL VOLUME, ML POC: 8 ML

## 2025-04-11 PROCEDURE — 99203 OFFICE O/P NEW LOW 30 MIN: CPT | Performed by: UROLOGY

## 2025-04-11 PROCEDURE — 51798 US URINE CAPACITY MEASURE: CPT | Performed by: UROLOGY

## 2025-04-11 NOTE — ASSESSMENT & PLAN NOTE
AUA SS low 4/1  PVR 8 mL  Normal PSA  Follow up PRN  Can continue prostate cancer screening until age 70, psa every year or two    Orders:    Ambulatory Referral to Urology    POCT Measure PVR

## 2025-04-28 DIAGNOSIS — K52.9 CHRONIC DIARRHEA: ICD-10-CM

## 2025-04-29 RX ORDER — CHOLESTYRAMINE 4 G/9G
POWDER, FOR SUSPENSION ORAL
Qty: 60 PACKET | Refills: 1 | Status: SHIPPED | OUTPATIENT
Start: 2025-04-29

## 2025-06-30 DIAGNOSIS — K52.9 CHRONIC DIARRHEA: ICD-10-CM

## 2025-06-30 RX ORDER — CHOLESTYRAMINE 4 G/9G
POWDER, FOR SUSPENSION ORAL
Qty: 180 PACKET | Refills: 1 | Status: SHIPPED | OUTPATIENT
Start: 2025-06-30

## 2025-07-25 ENCOUNTER — OFFICE VISIT (OUTPATIENT)
Dept: FAMILY MEDICINE CLINIC | Facility: CLINIC | Age: 60
End: 2025-07-25
Payer: COMMERCIAL

## 2025-07-25 VITALS
SYSTOLIC BLOOD PRESSURE: 130 MMHG | WEIGHT: 193.4 LBS | RESPIRATION RATE: 16 BRPM | BODY MASS INDEX: 33.02 KG/M2 | TEMPERATURE: 98 F | HEART RATE: 84 BPM | HEIGHT: 64 IN | OXYGEN SATURATION: 95 % | DIASTOLIC BLOOD PRESSURE: 82 MMHG

## 2025-07-25 DIAGNOSIS — Z13.88 SCREENING FOR HEAVY METAL POISONING: Primary | ICD-10-CM

## 2025-07-25 DIAGNOSIS — Z13.6 SCREENING FOR CARDIOVASCULAR CONDITION: ICD-10-CM

## 2025-07-25 DIAGNOSIS — Z13.1 SCREENING FOR DIABETES MELLITUS: ICD-10-CM

## 2025-07-25 PROCEDURE — 99213 OFFICE O/P EST LOW 20 MIN: CPT

## 2025-07-25 NOTE — PROGRESS NOTES
"Name: Jass Browne      : 1965      MRN: [id number]  Encounter Provider: MING Quesada  Encounter Date: 2025   Encounter department: [facility]  :  Assessment & Plan  Screening for heavy metal poisoning  Pt requesting heavy metal screening. Reports ongoing symptoms of double vision and diarrhea for the past few years. He has completed testing for both problems over the past few years. His workplace recently started bringing in bottled water and making other changes r/t water and appliances and he is concerned. The patient will obtain the lab work ordered today. The patient will be notified of results when available and also any further recommendations.    Orders:  •  Heavy metals screen; Future  •  Copper Level; Future  •  Zinc; Future  •  Lead, blood; Future    Screening for cardiovascular condition    Orders:  •  CBC and differential; Future  •  Comprehensive metabolic panel; Future  •  Lipid Panel with Direct LDL reflex; Future  •  TSH, 3rd generation with Free T4 reflex; Future    Screening for diabetes mellitus    Orders:  •  Hemoglobin A1C; Future      The patient will obtain the lab work ordered today. The patient will be notified of results when available and also any further recommendations. Declines printed AVS.        History of Present Illness   Jass Browne is a 59 y.o. year old male who presents today with a concern of possible lead exposure  Has been having double vision for 3 years with unknown cause. Went through testing.  Had diarrhea that started 1 year ago. Went through testing.   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 Lived there during Covid. Has concerns about the water contamination. New water jug about 1 year ago. Water no longer from well.   Pt concern is he  thinks he have been exposed to lead poisoning at work. At work employer removed everything that is connected " "to water like water dispenser, coffee machines and even ice cubes. Pt been getting double vision and he also have a history of bowels. He's very concern and want to do blood work to make sure everything is good.        Review of Systems   Constitutional: Negative.  Negative for chills, fatigue and fever.   HENT: Negative.  Negative for congestion, ear pain, rhinorrhea and sore throat.    Eyes:  Positive for visual disturbance. Negative for pain.   Respiratory: Negative.  Negative for cough and shortness of breath.    Cardiovascular: Negative.  Negative for chest pain, palpitations and leg swelling.   Gastrointestinal:  Positive for diarrhea. Negative for abdominal pain, constipation, nausea and vomiting.   Endocrine: Negative.    Genitourinary: Negative.  Negative for dysuria, frequency and urgency.   Musculoskeletal: Negative.  Negative for back pain and myalgias.   Skin: Negative.  Negative for rash.   Allergic/Immunologic: Negative.    Neurological: Negative.  Negative for dizziness, weakness, light-headedness and headaches.   Hematological: Negative.    Psychiatric/Behavioral: Negative.         Objective   /82 (BP Location: Left arm, Patient Position: Sitting, Cuff Size: Standard)   Pulse 84   Temp 98 °F (36.7 °C) (Tympanic)   Resp 16   Ht 5' 4\" (1.626 m)   Wt 87.7 kg (193 lb 6.4 oz)   SpO2 95%   BMI 33.20 kg/m²      Physical Exam  Vitals and nursing note reviewed.   Constitutional:       General: He is not in acute distress.     Appearance: Normal appearance. He is not ill-appearing.   HENT:      Head: Normocephalic and atraumatic.      Right Ear: External ear normal.      Left Ear: External ear normal.      Nose: Nose normal.      Mouth/Throat:      Mouth: Mucous membranes are moist.      Pharynx: Oropharynx is clear.     Eyes:      Extraocular Movements: Extraocular movements intact.      Conjunctiva/sclera: Conjunctivae normal.      Pupils: Pupils are equal, round, and reactive to light. "       Cardiovascular:      Rate and Rhythm: Normal rate and regular rhythm.      Heart sounds: Normal heart sounds. No murmur heard.  Pulmonary:      Effort: Pulmonary effort is normal. No respiratory distress.      Breath sounds: Normal breath sounds. No wheezing.   Abdominal:      General: Abdomen is flat. Bowel sounds are normal.      Palpations: Abdomen is soft.      Tenderness: There is no abdominal tenderness.     Musculoskeletal:         General: Normal range of motion.      Cervical back: Normal range of motion.     Skin:     General: Skin is warm and dry.      Capillary Refill: Capillary refill takes less than 2 seconds.     Neurological:      General: No focal deficit present.      Mental Status: He is alert and oriented to person, place, and time.     Psychiatric:         Mood and Affect: Mood normal.         Behavior: Behavior normal.         Thought Content: Thought content normal.

## 2025-07-26 ENCOUNTER — APPOINTMENT (OUTPATIENT)
Dept: LAB | Facility: HOSPITAL | Age: 60
End: 2025-07-26
Payer: COMMERCIAL

## 2025-07-26 DIAGNOSIS — Z13.6 SCREENING FOR CARDIOVASCULAR CONDITION: ICD-10-CM

## 2025-07-26 DIAGNOSIS — Z13.88 SCREENING FOR HEAVY METAL POISONING: ICD-10-CM

## 2025-07-26 DIAGNOSIS — Z13.1 SCREENING FOR DIABETES MELLITUS: ICD-10-CM

## 2025-07-26 LAB
ALBUMIN SERPL BCG-MCNC: 4.4 G/DL (ref 3.5–5)
ALP SERPL-CCNC: 71 U/L (ref 34–104)
ALT SERPL W P-5'-P-CCNC: 37 U/L (ref 7–52)
ANION GAP SERPL CALCULATED.3IONS-SCNC: 10 MMOL/L (ref 4–13)
AST SERPL W P-5'-P-CCNC: 21 U/L (ref 13–39)
BASOPHILS # BLD AUTO: 0.06 THOUSANDS/ÂΜL (ref 0–0.1)
BASOPHILS NFR BLD AUTO: 1 % (ref 0–1)
BILIRUB SERPL-MCNC: 0.56 MG/DL (ref 0.2–1)
BUN SERPL-MCNC: 17 MG/DL (ref 5–25)
CALCIUM SERPL-MCNC: 8.7 MG/DL (ref 8.4–10.2)
CHLORIDE SERPL-SCNC: 107 MMOL/L (ref 96–108)
CHOLEST SERPL-MCNC: 165 MG/DL (ref ?–200)
CO2 SERPL-SCNC: 23 MMOL/L (ref 21–32)
CREAT SERPL-MCNC: 0.68 MG/DL (ref 0.6–1.3)
EOSINOPHIL # BLD AUTO: 0.19 THOUSAND/ÂΜL (ref 0–0.61)
EOSINOPHIL NFR BLD AUTO: 3 % (ref 0–6)
ERYTHROCYTE [DISTWIDTH] IN BLOOD BY AUTOMATED COUNT: 11.9 % (ref 11.6–15.1)
EST. AVERAGE GLUCOSE BLD GHB EST-MCNC: 105 MG/DL
GFR SERPL CREATININE-BSD FRML MDRD: 105 ML/MIN/1.73SQ M
GLUCOSE P FAST SERPL-MCNC: 119 MG/DL (ref 65–99)
HBA1C MFR BLD: 5.3 %
HCT VFR BLD AUTO: 42.2 % (ref 36.5–49.3)
HDLC SERPL-MCNC: 36 MG/DL
HGB BLD-MCNC: 14.9 G/DL (ref 12–17)
IMM GRANULOCYTES # BLD AUTO: 0.03 THOUSAND/UL (ref 0–0.2)
IMM GRANULOCYTES NFR BLD AUTO: 1 % (ref 0–2)
LDLC SERPL CALC-MCNC: 113 MG/DL (ref 0–100)
LYMPHOCYTES # BLD AUTO: 1.6 THOUSANDS/ÂΜL (ref 0.6–4.47)
LYMPHOCYTES NFR BLD AUTO: 26 % (ref 14–44)
MCH RBC QN AUTO: 30.7 PG (ref 26.8–34.3)
MCHC RBC AUTO-ENTMCNC: 35.3 G/DL (ref 31.4–37.4)
MCV RBC AUTO: 87 FL (ref 82–98)
MONOCYTES # BLD AUTO: 0.46 THOUSAND/ÂΜL (ref 0.17–1.22)
MONOCYTES NFR BLD AUTO: 8 % (ref 4–12)
NEUTROPHILS # BLD AUTO: 3.79 THOUSANDS/ÂΜL (ref 1.85–7.62)
NEUTS SEG NFR BLD AUTO: 61 % (ref 43–75)
NRBC BLD AUTO-RTO: 0 /100 WBCS
PLATELET # BLD AUTO: 243 THOUSANDS/UL (ref 149–390)
PMV BLD AUTO: 9.6 FL (ref 8.9–12.7)
POTASSIUM SERPL-SCNC: 3.7 MMOL/L (ref 3.5–5.3)
PROT SERPL-MCNC: 6.7 G/DL (ref 6.4–8.4)
RBC # BLD AUTO: 4.85 MILLION/UL (ref 3.88–5.62)
SODIUM SERPL-SCNC: 140 MMOL/L (ref 135–147)
T4 FREE SERPL-MCNC: 0.82 NG/DL (ref 0.61–1.12)
TRIGL SERPL-MCNC: 80 MG/DL (ref ?–150)
TSH SERPL DL<=0.05 MIU/L-ACNC: 5.35 UIU/ML (ref 0.45–4.5)
WBC # BLD AUTO: 6.13 THOUSAND/UL (ref 4.31–10.16)

## 2025-07-26 PROCEDURE — 85025 COMPLETE CBC W/AUTO DIFF WBC: CPT

## 2025-07-26 PROCEDURE — 83825 ASSAY OF MERCURY: CPT

## 2025-07-26 PROCEDURE — 80053 COMPREHEN METABOLIC PANEL: CPT

## 2025-07-26 PROCEDURE — 82175 ASSAY OF ARSENIC: CPT

## 2025-07-26 PROCEDURE — 83655 ASSAY OF LEAD: CPT

## 2025-07-26 PROCEDURE — 36415 COLL VENOUS BLD VENIPUNCTURE: CPT

## 2025-07-26 PROCEDURE — 83036 HEMOGLOBIN GLYCOSYLATED A1C: CPT

## 2025-07-26 PROCEDURE — 84443 ASSAY THYROID STIM HORMONE: CPT

## 2025-07-26 PROCEDURE — 84439 ASSAY OF FREE THYROXINE: CPT

## 2025-07-26 PROCEDURE — 82525 ASSAY OF COPPER: CPT

## 2025-07-26 PROCEDURE — 84630 ASSAY OF ZINC: CPT

## 2025-07-26 PROCEDURE — 80061 LIPID PANEL: CPT

## 2025-07-26 PROCEDURE — 82300 ASSAY OF CADMIUM: CPT

## 2025-07-28 LAB
COPPER SERPL-MCNC: 92 UG/DL (ref 69–132)
ZINC SERPL-MCNC: 93 UG/DL (ref 44–115)

## 2025-07-29 ENCOUNTER — TELEPHONE (OUTPATIENT)
Age: 60
End: 2025-07-29

## 2025-07-30 LAB
ARSENIC BLD-MCNC: 5 UG/L (ref 0–9)
CADMIUM BLD-MCNC: <0.5 UG/L (ref 0–1.2)
LEAD BLD-MCNC: NORMAL UG/DL (ref 0–3.4)
MERCURY BLD-MCNC: 5.8 UG/L (ref 0–14.9)

## 2025-07-31 ENCOUNTER — APPOINTMENT (OUTPATIENT)
Dept: LAB | Facility: HOSPITAL | Age: 60
End: 2025-07-31
Payer: COMMERCIAL

## 2025-07-31 DIAGNOSIS — Z13.88 SCREENING FOR HEAVY METAL POISONING: ICD-10-CM

## 2025-07-31 PROCEDURE — 36415 COLL VENOUS BLD VENIPUNCTURE: CPT

## 2025-07-31 PROCEDURE — 83655 ASSAY OF LEAD: CPT

## 2025-08-01 LAB — LEAD BLD-MCNC: <1 UG/DL (ref 0–3.4)
